# Patient Record
Sex: MALE | Race: WHITE | Employment: OTHER | URBAN - METROPOLITAN AREA
[De-identification: names, ages, dates, MRNs, and addresses within clinical notes are randomized per-mention and may not be internally consistent; named-entity substitution may affect disease eponyms.]

---

## 2020-08-10 ENCOUNTER — HOSPITAL ENCOUNTER (OUTPATIENT)
Age: 70
Discharge: HOME | End: 2020-08-12

## 2020-08-10 PROCEDURE — 87088 URINE BACTERIA CULTURE: CPT

## 2020-08-12 LAB — URINE CULTURE, ROUTINE: NORMAL

## 2021-09-28 ENCOUNTER — HOSPITAL ENCOUNTER (EMERGENCY)
Age: 71
Discharge: HOME | End: 2021-09-28
Attending: EMERGENCY MEDICINE
Payer: MEDICARE

## 2021-09-28 ENCOUNTER — APPOINTMENT (OUTPATIENT)
Dept: CT IMAGING | Age: 71
End: 2021-09-28
Payer: MEDICARE

## 2021-09-28 VITALS
HEART RATE: 78 BPM | HEIGHT: 66 IN | OXYGEN SATURATION: 95 % | RESPIRATION RATE: 20 BRPM | WEIGHT: 169 LBS | SYSTOLIC BLOOD PRESSURE: 148 MMHG | TEMPERATURE: 98.3 F | DIASTOLIC BLOOD PRESSURE: 79 MMHG | BODY MASS INDEX: 27.16 KG/M2

## 2021-09-28 DIAGNOSIS — N20.0 KIDNEY STONE: Primary | ICD-10-CM

## 2021-09-28 DIAGNOSIS — K83.8 PNEUMOBILIA: ICD-10-CM

## 2021-09-28 LAB
ANION GAP SERPL CALCULATED.3IONS-SCNC: 11 MMOL/L (ref 7–16)
BACTERIA: NORMAL /HPF
BASOPHILS ABSOLUTE: 0.06 E9/L (ref 0–0.2)
BASOPHILS RELATIVE PERCENT: 0.4 % (ref 0–2)
BILIRUBIN URINE: NEGATIVE
BLOOD, URINE: NORMAL
BUN BLDV-MCNC: 21 MG/DL (ref 6–23)
CALCIUM SERPL-MCNC: 9.4 MG/DL (ref 8.6–10.2)
CHLORIDE BLD-SCNC: 102 MMOL/L (ref 98–107)
CLARITY: CLEAR
CO2: 28 MMOL/L (ref 22–29)
COLOR: YELLOW
CREAT SERPL-MCNC: 1.4 MG/DL (ref 0.7–1.2)
EOSINOPHILS ABSOLUTE: 0.01 E9/L (ref 0.05–0.5)
EOSINOPHILS RELATIVE PERCENT: 0.1 % (ref 0–6)
EPITHELIAL CELLS, UA: NORMAL /HPF
GFR AFRICAN AMERICAN: >60
GFR NON-AFRICAN AMERICAN: 50 ML/MIN/1.73
GLUCOSE BLD-MCNC: 180 MG/DL (ref 74–99)
GLUCOSE URINE: NEGATIVE MG/DL
HCT VFR BLD CALC: 45.6 % (ref 37–54)
HEMOGLOBIN: 15.9 G/DL (ref 12.5–16.5)
IMMATURE GRANULOCYTES #: 0.11 E9/L
IMMATURE GRANULOCYTES %: 0.8 % (ref 0–5)
KETONES, URINE: NEGATIVE MG/DL
LEUKOCYTE ESTERASE, URINE: NEGATIVE
LYMPHOCYTES ABSOLUTE: 0.55 E9/L (ref 1.5–4)
LYMPHOCYTES RELATIVE PERCENT: 3.9 % (ref 20–42)
MCH RBC QN AUTO: 32.9 PG (ref 26–35)
MCHC RBC AUTO-ENTMCNC: 34.9 % (ref 32–34.5)
MCV RBC AUTO: 94.2 FL (ref 80–99.9)
MONOCYTES ABSOLUTE: 0.72 E9/L (ref 0.1–0.95)
MONOCYTES RELATIVE PERCENT: 5.2 % (ref 2–12)
NEUTROPHILS ABSOLUTE: 12.5 E9/L (ref 1.8–7.3)
NEUTROPHILS RELATIVE PERCENT: 89.6 % (ref 43–80)
NITRITE, URINE: NEGATIVE
PDW BLD-RTO: 13.2 FL (ref 11.5–15)
PH UA: 5 (ref 5–9)
PLATELET # BLD: 223 E9/L (ref 130–450)
PMV BLD AUTO: 9.6 FL (ref 7–12)
POTASSIUM SERPL-SCNC: 4.5 MMOL/L (ref 3.5–5)
PROTEIN UA: NEGATIVE MG/DL
RBC # BLD: 4.84 E12/L (ref 3.8–5.8)
RBC UA: NORMAL /HPF (ref 0–2)
SODIUM BLD-SCNC: 141 MMOL/L (ref 132–146)
SPECIFIC GRAVITY UA: >=1.03 (ref 1–1.03)
UROBILINOGEN, URINE: 0.2 E.U./DL
WBC # BLD: 14 E9/L (ref 4.5–11.5)
WBC UA: NORMAL /HPF (ref 0–5)

## 2021-09-28 PROCEDURE — 6360000002 HC RX W HCPCS: Performed by: EMERGENCY MEDICINE

## 2021-09-28 PROCEDURE — 80048 BASIC METABOLIC PNL TOTAL CA: CPT

## 2021-09-28 PROCEDURE — 96375 TX/PRO/DX INJ NEW DRUG ADDON: CPT

## 2021-09-28 PROCEDURE — 36415 COLL VENOUS BLD VENIPUNCTURE: CPT

## 2021-09-28 PROCEDURE — 2580000003 HC RX 258: Performed by: EMERGENCY MEDICINE

## 2021-09-28 PROCEDURE — 74176 CT ABD & PELVIS W/O CONTRAST: CPT

## 2021-09-28 PROCEDURE — 85025 COMPLETE CBC W/AUTO DIFF WBC: CPT

## 2021-09-28 PROCEDURE — 96374 THER/PROPH/DIAG INJ IV PUSH: CPT

## 2021-09-28 PROCEDURE — 99283 EMERGENCY DEPT VISIT LOW MDM: CPT

## 2021-09-28 PROCEDURE — 81001 URINALYSIS AUTO W/SCOPE: CPT

## 2021-09-28 PROCEDURE — 6370000000 HC RX 637 (ALT 250 FOR IP): Performed by: EMERGENCY MEDICINE

## 2021-09-28 RX ORDER — TAMSULOSIN HYDROCHLORIDE 0.4 MG/1
0.4 CAPSULE ORAL DAILY
Qty: 14 CAPSULE | Refills: 0 | Status: SHIPPED | OUTPATIENT
Start: 2021-09-28 | End: 2021-10-12

## 2021-09-28 RX ORDER — AMLODIPINE BESYLATE 5 MG/1
5 TABLET ORAL DAILY
COMMUNITY

## 2021-09-28 RX ORDER — HYDROCODONE BITARTRATE AND ACETAMINOPHEN 5; 325 MG/1; MG/1
1 TABLET ORAL ONCE
Status: COMPLETED | OUTPATIENT
Start: 2021-09-28 | End: 2021-09-28

## 2021-09-28 RX ORDER — 0.9 % SODIUM CHLORIDE 0.9 %
500 INTRAVENOUS SOLUTION INTRAVENOUS ONCE
Status: COMPLETED | OUTPATIENT
Start: 2021-09-28 | End: 2021-09-28

## 2021-09-28 RX ORDER — HYDROCODONE BITARTRATE AND ACETAMINOPHEN 5; 325 MG/1; MG/1
1 TABLET ORAL EVERY 6 HOURS PRN
Qty: 6 TABLET | Refills: 0 | Status: SHIPPED | OUTPATIENT
Start: 2021-09-28 | End: 2021-10-01

## 2021-09-28 RX ORDER — ALLOPURINOL 300 MG/1
300 TABLET ORAL DAILY
COMMUNITY

## 2021-09-28 RX ORDER — LISINOPRIL AND HYDROCHLOROTHIAZIDE 25; 20 MG/1; MG/1
1 TABLET ORAL DAILY
COMMUNITY

## 2021-09-28 RX ORDER — IBUPROFEN 800 MG/1
800 TABLET ORAL EVERY 8 HOURS PRN
Qty: 21 TABLET | Refills: 0 | Status: SHIPPED | OUTPATIENT
Start: 2021-09-28 | End: 2021-10-05

## 2021-09-28 RX ORDER — PRAVASTATIN SODIUM 40 MG
40 TABLET ORAL DAILY
COMMUNITY

## 2021-09-28 RX ORDER — ONDANSETRON 4 MG/1
4 TABLET, ORALLY DISINTEGRATING ORAL EVERY 8 HOURS PRN
Qty: 24 TABLET | Refills: 0 | Status: SHIPPED | OUTPATIENT
Start: 2021-09-28 | End: 2021-10-08

## 2021-09-28 RX ORDER — ONDANSETRON 2 MG/ML
8 INJECTION INTRAMUSCULAR; INTRAVENOUS ONCE
Status: COMPLETED | OUTPATIENT
Start: 2021-09-28 | End: 2021-09-28

## 2021-09-28 RX ORDER — KETOROLAC TROMETHAMINE 30 MG/ML
15 INJECTION, SOLUTION INTRAMUSCULAR; INTRAVENOUS ONCE
Status: COMPLETED | OUTPATIENT
Start: 2021-09-28 | End: 2021-09-28

## 2021-09-28 RX ADMIN — SODIUM CHLORIDE 500 ML: 9 INJECTION, SOLUTION INTRAVENOUS at 08:19

## 2021-09-28 RX ADMIN — HYDROCODONE BITARTRATE AND ACETAMINOPHEN 1 TABLET: 5; 325 TABLET ORAL at 09:11

## 2021-09-28 RX ADMIN — SODIUM CHLORIDE 500 ML: 9 INJECTION, SOLUTION INTRAVENOUS at 07:29

## 2021-09-28 RX ADMIN — KETOROLAC TROMETHAMINE 15 MG: 30 INJECTION, SOLUTION INTRAMUSCULAR at 07:29

## 2021-09-28 RX ADMIN — ONDANSETRON 8 MG: 2 INJECTION INTRAMUSCULAR; INTRAVENOUS at 07:29

## 2021-09-28 ASSESSMENT — PAIN SCALES - GENERAL
PAINLEVEL_OUTOF10: 8
PAINLEVEL_OUTOF10: 8
PAINLEVEL_OUTOF10: 4

## 2021-09-28 ASSESSMENT — PAIN DESCRIPTION - LOCATION: LOCATION: ABDOMEN;FLANK

## 2021-09-28 ASSESSMENT — ENCOUNTER SYMPTOMS
SINUS PRESSURE: 0
NAUSEA: 1
COUGH: 0
WHEEZING: 0
VOMITING: 1
ABDOMINAL PAIN: 1
EYE PAIN: 0
SORE THROAT: 0
DIARRHEA: 0
EYE REDNESS: 0
SHORTNESS OF BREATH: 0
BLOOD IN STOOL: 0
EYE DISCHARGE: 0
BACK PAIN: 0

## 2021-09-28 ASSESSMENT — PAIN DESCRIPTION - DESCRIPTORS: DESCRIPTORS: SHARP

## 2021-09-28 ASSESSMENT — PAIN DESCRIPTION - ORIENTATION: ORIENTATION: LEFT

## 2021-09-28 ASSESSMENT — PAIN DESCRIPTION - PAIN TYPE: TYPE: ACUTE PAIN

## 2021-09-28 NOTE — ED PROVIDER NOTES
Department of Emergency Medicine   ED  Provider Note  Admit Date/RoomTime: 9/28/2021  5:23 AM  ED Room: 08/08 9/28/21  6:25 AM EDT    History of Present Illness:  Chief Complaint   Patient presents with    Abdominal Pain     left flank and abd pain x 3 days    Flank Pain    Tawny Goodwin is a 78 y.o. male presenting to the ED for left flank pain radiating to LLQ since SUnday. The complaint has been intermittent, severe in severity, and worsened by nothing. Pain had improvd yesterday, worsened again since 3am with associated nausea and vomiting. He denies any fevers, chills, dysuria, diarrhea constipation. He followed with Dr. Jarrett Lam for enlarged prostate and urolith        Review of Systems:   Pertinent positives and negatives are stated within HPI, all other systems reviewed and are negative. Review of Systems   Constitutional: Negative for chills and fever. HENT: Negative for ear pain, sinus pressure and sore throat. Eyes: Negative for pain, discharge and redness. Respiratory: Negative for cough, shortness of breath and wheezing. Cardiovascular: Negative for chest pain. Gastrointestinal: Positive for abdominal pain, nausea and vomiting. Negative for blood in stool and diarrhea. Genitourinary: Positive for flank pain. Negative for dysuria, frequency, scrotal swelling and testicular pain. Musculoskeletal: Negative for arthralgias and back pain. Skin: Negative for rash and wound. Neurological: Negative for weakness and headaches. Hematological: Negative for adenopathy. All other systems reviewed and are negative.           --------------------------------------------- PAST HISTORY ---------------------------------------------  Past Medical History:  has a past medical history of Hyperlipidemia and Hypertension. Past Surgical History:  has a past surgical history that includes Cholecystectomy. Social History:  reports that he has quit smoking.  He has never used smokeless tobacco. He reports previous alcohol use. He reports that he does not use drugs. Family History: family history is not on file. Unless otherwise noted, family history is non contributory    The patients home medications have been reviewed. Allergies: Lipitor [atorvastatin]        ------------------------- NURSING NOTES AND VITALS REVIEWED ---------------------------   The nursing notes within the ED encounter and vital signs as below have been reviewed. BP (!) 148/79   Pulse 78   Temp 98.3 °F (36.8 °C) (Oral)   Resp 20   Ht 5' 6\" (1.676 m)   Wt 169 lb (76.7 kg)   SpO2 95%   BMI 27.28 kg/m²   Oxygen Saturation Interpretation: Normal    The patients available past medical records and past encounters were reviewed. ---------------------------------------------------PHYSICAL EXAM--------------------------------------    Physical Exam  Vitals and nursing note reviewed. Constitutional:       Appearance: He is well-developed. HENT:      Head: Normocephalic and atraumatic. Eyes:      Conjunctiva/sclera: Conjunctivae normal.   Cardiovascular:      Rate and Rhythm: Normal rate and regular rhythm. Pulses: Normal pulses. Heart sounds: Normal heart sounds. No murmur heard. Pulmonary:      Effort: Pulmonary effort is normal. No respiratory distress. Breath sounds: Normal breath sounds. No wheezing or rales. Abdominal:      General: Bowel sounds are normal.      Palpations: Abdomen is soft. Tenderness: There is abdominal tenderness in the left lower quadrant. There is no guarding or rebound. Musculoskeletal:         General: No tenderness or deformity. Cervical back: Normal range of motion and neck supple. Skin:     General: Skin is warm and dry. Neurological:      Mental Status: He is alert and oriented to person, place, and time. Cranial Nerves: No cranial nerve deficit.       Coordination: Coordination normal. -------------------------------------------------- RESULTS -------------------------------------------------    LABS: (Lab results interpreted by me)  Results for orders placed or performed during the hospital encounter of 09/28/21   Urinalysis with Microscopic   Result Value Ref Range    Color, UA Yellow Straw/Yellow    Clarity, UA Clear Clear    Glucose, Ur Negative Negative mg/dL    Bilirubin Urine Negative Negative    Ketones, Urine Negative Negative mg/dL    Specific Gravity, UA >=1.030 1.005 - 1.030    Blood, Urine TRACE-INTACT Negative    pH, UA 5.0 5.0 - 9.0    Protein, UA Negative Negative mg/dL    Urobilinogen, Urine 0.2 <2.0 E.U./dL    Nitrite, Urine Negative Negative    Leukocyte Esterase, Urine Negative Negative    WBC, UA 0-1 0 - 5 /HPF    RBC, UA 0-1 0 - 2 /HPF    Epithelial Cells, UA FEW /HPF    Bacteria, UA NONE SEEN None Seen /HPF   CBC Auto Differential   Result Value Ref Range    WBC 14.0 (H) 4.5 - 11.5 E9/L    RBC 4.84 3.80 - 5.80 E12/L    Hemoglobin 15.9 12.5 - 16.5 g/dL    Hematocrit 45.6 37.0 - 54.0 %    MCV 94.2 80.0 - 99.9 fL    MCH 32.9 26.0 - 35.0 pg    MCHC 34.9 (H) 32.0 - 34.5 %    RDW 13.2 11.5 - 15.0 fL    Platelets 937 417 - 768 E9/L    MPV 9.6 7.0 - 12.0 fL    Neutrophils % 89.6 (H) 43.0 - 80.0 %    Immature Granulocytes % 0.8 0.0 - 5.0 %    Lymphocytes % 3.9 (L) 20.0 - 42.0 %    Monocytes % 5.2 2.0 - 12.0 %    Eosinophils % 0.1 0.0 - 6.0 %    Basophils % 0.4 0.0 - 2.0 %    Neutrophils Absolute 12.50 (H) 1.80 - 7.30 E9/L    Immature Granulocytes # 0.11 E9/L    Lymphocytes Absolute 0.55 (L) 1.50 - 4.00 E9/L    Monocytes Absolute 0.72 0.10 - 0.95 E9/L    Eosinophils Absolute 0.01 (L) 0.05 - 0.50 E9/L    Basophils Absolute 0.06 0.00 - 0.20 W0/B   Basic Metabolic Panel   Result Value Ref Range    Sodium 141 132 - 146 mmol/L    Potassium 4.5 3.5 - 5.0 mmol/L    Chloride 102 98 - 107 mmol/L    CO2 28 22 - 29 mmol/L    Anion Gap 11 7 - 16 mmol/L    Glucose 180 (H) 74 - 99 mg/dL BUN 21 6 - 23 mg/dL    CREATININE 1.4 (H) 0.7 - 1.2 mg/dL    GFR Non-African American 50 >=60 mL/min/1.73    GFR African American >60     Calcium 9.4 8.6 - 10.2 mg/dL       All radiology results have been personally reviewed by myself   RADIOLOGY:  Interpreted by Radiologist.  Clare Sweet CONTRAST Additional Contrast? None   Final Result   6 mm proximal left ureteral stone with mild hydronephrosis. Mild pneumobilia. Debris-filled periampullary duodenal diverticulum measuring 3.3 cm. Heterogeneous enlarged prostate.                 ------------------------------ ED COURSE/MEDICAL DECISION MAKING----------------------  Medications   ondansetron (ZOFRAN) injection 8 mg (8 mg IntraVENous Given 21 07)   ketorolac (TORADOL) injection 15 mg (15 mg IntraVENous Given 21 07)   0.9 % sodium chloride bolus (0 mLs IntraVENous Stopped 21 0907)   0.9 % sodium chloride bolus (0 mLs IntraVENous Stopped 21 0940)   HYDROcodone-acetaminophen (NORCO) 5-325 MG per tablet 1 tablet (1 tablet Oral Given 21 0911)       Re-evaluations & Consults:  ED Course as of Sep 28 1032   Tue Sep 28, 2021   0609 Not currently in room    [MF]   0802 Feeling better discussed results and plan. He is agreeable all questions answered. He does not know his baseline renal function instructed patient to follow-up for monitoring as well as follow-up with urology for the stone and return precautions    [MF]      ED Course User Index  [MF] Carleen Hawley, DO          Medical Decision Makin-year-old male with renal stone feeling better after medication, IV fluids, discharged home with symptomatic supportive care, urology follow-up and return precautions      This patient's ED course included: a personal history and physicial examination and re-evaluation prior to disposition    This patient has remained hemodynamically stable and improved during their ED course.       Critical Care Time:

## 2021-09-30 ENCOUNTER — HOSPITAL ENCOUNTER (EMERGENCY)
Age: 71
Discharge: HOME | End: 2021-10-01
Attending: EMERGENCY MEDICINE
Payer: MEDICARE

## 2021-09-30 VITALS
BODY MASS INDEX: 27.16 KG/M2 | WEIGHT: 169 LBS | SYSTOLIC BLOOD PRESSURE: 137 MMHG | DIASTOLIC BLOOD PRESSURE: 70 MMHG | OXYGEN SATURATION: 96 % | HEART RATE: 66 BPM | RESPIRATION RATE: 16 BRPM | TEMPERATURE: 98.7 F | HEIGHT: 66 IN

## 2021-09-30 DIAGNOSIS — N20.1 URETEROLITHIASIS: Primary | ICD-10-CM

## 2021-09-30 PROCEDURE — 99283 EMERGENCY DEPT VISIT LOW MDM: CPT

## 2021-10-01 NOTE — ED PROVIDER NOTES
HPI:  10/1/21, Time: 12:54 AM EDT        Vanessa Martinez is a 78 y.o. male presenting to the ED for complaint of constipation and flank pain that was this morning similar to his leg pain for the past week for kidney stones that now resolved, beginning 1 week ago. The complaint has been intermittent, mild in severity, and worsened by nothing. He states he has no pain at this time did have some flank pain earlier states he does have anxiety over taking the pain medication prescribed by urology for his kidney stone and was presenting to the emergency department to determine if he needs to continue to take these. Patient denies any take pain at this time physical exam was completely reassuring patient was well-appearing and denied any complaints. .     Patient denies fever/chills, sore throat, cough, congestion, chest pain, shortness of breath, edema, headache, visual disturbances, focal paresthesias, focal weakness, abdominal pain, nausea, vomiting, diarrhea, constipation, dysuria, hematuria, trauma, neck or back pain, rash or other complaints. ROS:   A complete review of systems was performed and all pertinent positives and negatives are stated within HPI, all other systems reviewed and are negative.            --------------------------------------------- PAST HISTORY ---------------------------------------------  Past Medical History:  has a past medical history of Hyperlipidemia and Hypertension. Past Surgical History:  has a past surgical history that includes Cholecystectomy. Social History:  reports that he has quit smoking. He has never used smokeless tobacco. He reports previous alcohol use. He reports that he does not use drugs. Family History: family history is not on file. The patients home medications have been reviewed.     Allergies: Lipitor [atorvastatin]        ----------------------------------------PHYSICAL EXAM--------------------------------------    Constitutional:  Well developed, well nourished, no acute distress, non-toxic appearance   Eyes:  PERRL, conjunctiva normal, EOMI  HENT:  Atraumatic, external ears normal, nose normal, oropharynx moist, no pharyngeal exudates. Neck- normal range of motion, no nuchal rigidity   Respiratory:  No respiratory distress, normal breath sounds, no rales, no wheezing   Cardiovascular:  Normal rate, normal rhythm, no murmurs, no gallops, no rubs. Radial and DP pulses 2+ bilaterally. GI:  Soft, nondistended, normal bowel sounds, nontender, no organomegaly, no mass, no rebound, no guarding   :  No costovertebral angle tenderness   Musculoskeletal:  No edema, no tenderness, no deformities. Back- no tenderness  Integument:  Well hydrated, no rash. Adequate perfusion. Lymphatic:  No cervical lymphadenopathy noted   Neurologic:  Alert & oriented x 3, CN 2-12 normal, normal motor function, normal sensory function, no focal deficits noted. Normal gait. Psychiatric:  Speech and behavior appropriate       -------------------------------------------------- RESULTS -------------------------------------------------  I have personally reviewed all laboratory and imaging results for this patient. Results are listed below. LABS:  No results found for this visit on 09/30/21. RADIOLOGY:  Interpreted by Radiologist.  No orders to display             ------------------------- NURSING NOTES AND VITALS REVIEWED ---------------------------  The nursing notes within the ED encounter and vital signs as below have been reviewed by myself. /70   Pulse 66   Temp 98.7 °F (37.1 °C) (Tympanic)   Resp 16   Ht 5' 6\" (1.676 m)   Wt 169 lb (76.7 kg)   SpO2 96%   BMI 27.28 kg/m²   Oxygen Saturation Interpretation: Normal      The patients available past medical records and past encounters were reviewed.         ------------------------------ ED COURSE/MEDICAL DECISION MAKING----------------------  Medications - No data to display MEDICATIONS  New Prescriptions    No medications on file               Medical Decision Making:    After reassuring physical examination and evaluation patient was reassured that did not need to take the pain medication prescribed to him if he did not have pain in this flank. He was also told that he could take Tylenol 650 mg every 6 hours for any recurrence of pain if he does not feel comfortable taking the prescription pain medications given to him. Patient was told to continue with his scheduled follow-up with urology on Monday and to come back to the emergency department if he has any worsening conditions or other concerns. Patient was explicitly instructed on specific signs and symptoms on which to return to the emergency room for. Patient was instructed to return to the ER for any new or worsening symptoms. Additional discharge instructions were given verbally. All questions were answered. Patient is comfortable and agreeable with discharge plan. Patient in no acute distress and non-toxic in appearance. This patient's ED course included: re-evaluation prior to disposition, multiple bedside re-evaluations and a personal history and physicial eaxmination    This patient has remained hemodynamically stable and been closely monitored during their ED course. Re-Evaluations:  Time: 2330   Re-evaluation. Patients symptoms show no change  Repeat physical examination is not changed            Counseling: The emergency provider has spoken with the patient and discussed todays results, in addition to providing specific details for the plan of care and counseling regarding the diagnosis and prognosis. Questions are answered at this time and they are agreeable with the plan.    --------------------------- IMPRESSION AND DISPOSITION ---------------------------------    IMPRESSION  1.  Ureterolithiasis        DISPOSITION  Disposition: Discharge to home  Patient condition is stable             Natalia Martínez Alta Mejia, DO  Resident  10/01/21 5412

## 2021-10-01 NOTE — ED NOTES
FIRST PROVIDER CONTACT ASSESSMENT NOTE      Department of Emergency Medicine   9/30/21  10:21 PM EDT    Chief Complaint: Flank Pain (has kidney stone diagnosis on tuesday here, has not passed yet) and Constipation (hasnt gone since monday)      History of Present Illness:   Cruzito Lomeli is a 78 y.o. male who presents to the ED for constipation and abdominal pain     Medical History:  has a past medical history of Hyperlipidemia and Hypertension. Surgical History:  has a past surgical history that includes Cholecystectomy. Social History:  reports that he has quit smoking. He has never used smokeless tobacco. He reports previous alcohol use. He reports that he does not use drugs. Family History: family history is not on file.     *ALLERGIES*     Lipitor [atorvastatin]     Physical Exam:      VS:  /70   Pulse 66   Temp 98.7 °F (37.1 °C) (Tympanic)   Resp 16   Ht 5' 6\" (1.676 m)   Wt 169 lb (76.7 kg)   SpO2 96%   BMI 27.28 kg/m²      Initial Plan of Care:  Initiate Treatment-Testing, Proceed toTreatment Area When Bed Available for ED Attending/MLP to Continue Care    -----------------END OF FIRST PROVIDER CONTACT ASSESSMENT NOTE--------------  Electronically signed by GARETH Ayers CNP   DD: 9/30/21           GARETH Mckinney CNP  09/30/21 7111

## 2021-10-01 NOTE — ED NOTES
FIRST PROVIDER CONTACT ASSESSMENT NOTE      Department of Emergency Medicine   9/30/21  10:21 PM EDT    Chief Complaint: Flank Pain (has kidney stone diagnosis on tuesday here, has not passed yet) and Constipation (hasnt gone since monday)      History of Present Illness:   Gary Jon is a 78 y.o. male who presents to the ED for abdominal pain, constipation     Medical History:  has a past medical history of Hyperlipidemia and Hypertension. Surgical History:  has a past surgical history that includes Cholecystectomy. Social History:  reports that he has quit smoking. He has never used smokeless tobacco. He reports previous alcohol use. He reports that he does not use drugs. Family History: family history is not on file.     *ALLERGIES*     Lipitor [atorvastatin]     Physical Exam:      VS:  /70   Pulse 66   Temp 98.7 °F (37.1 °C) (Tympanic)   Resp 16   Ht 5' 6\" (1.676 m)   Wt 169 lb (76.7 kg)   SpO2 96%   BMI 27.28 kg/m²      Initial Plan of Care:  Initiate Treatment-Testing, Proceed toTreatment Area When Bed Available for ED Attending/MLP to Continue Care    -----------------END OF FIRST PROVIDER CONTACT ASSESSMENT NOTE--------------  Electronically signed by GARETH Gonzalez CNP   DD: 9/30/21           GARETH Contreras CNP  09/30/21 0613

## 2021-10-04 ENCOUNTER — HOSPITAL ENCOUNTER (OUTPATIENT)
Age: 71
Discharge: HOME | End: 2021-10-06

## 2021-10-04 PROCEDURE — 82365 CALCULUS SPECTROSCOPY: CPT

## 2021-10-04 PROCEDURE — 88300 SURGICAL PATH GROSS: CPT

## 2021-10-09 LAB
CALCULI COMPOSITION: NORMAL
MASS: 9 MG
STONE DESCRIPTION: NORMAL
STONE NUMBER: 3
STONE SIZE: NORMAL MM

## 2023-07-19 RX ORDER — FINASTERIDE 5 MG/1
TABLET, FILM COATED ORAL
COMMUNITY

## 2023-07-19 RX ORDER — PRAVASTATIN SODIUM 40 MG
TABLET ORAL
COMMUNITY
Start: 2021-01-19

## 2023-07-19 RX ORDER — LISINOPRIL AND HYDROCHLOROTHIAZIDE 25; 20 MG/1; MG/1
TABLET ORAL
COMMUNITY
Start: 2021-01-19

## 2023-07-20 ENCOUNTER — OFFICE VISIT (OUTPATIENT)
Dept: PRIMARY CARE CLINIC | Age: 73
End: 2023-07-20
Payer: MEDICARE

## 2023-07-20 VITALS
HEART RATE: 70 BPM | HEIGHT: 66 IN | DIASTOLIC BLOOD PRESSURE: 72 MMHG | SYSTOLIC BLOOD PRESSURE: 120 MMHG | TEMPERATURE: 98.1 F | BODY MASS INDEX: 28.06 KG/M2 | OXYGEN SATURATION: 96 % | WEIGHT: 174.6 LBS

## 2023-07-20 DIAGNOSIS — I10 PRIMARY HYPERTENSION: Primary | ICD-10-CM

## 2023-07-20 DIAGNOSIS — M10.00 IDIOPATHIC GOUT, UNSPECIFIED CHRONICITY, UNSPECIFIED SITE: ICD-10-CM

## 2023-07-20 DIAGNOSIS — E78.5 HYPERLIPIDEMIA, UNSPECIFIED HYPERLIPIDEMIA TYPE: ICD-10-CM

## 2023-07-20 DIAGNOSIS — N40.0 BENIGN PROSTATIC HYPERPLASIA WITHOUT LOWER URINARY TRACT SYMPTOMS: ICD-10-CM

## 2023-07-20 DIAGNOSIS — M62.838 MUSCLE SPASMS OF BOTH LOWER EXTREMITIES: ICD-10-CM

## 2023-07-20 PROBLEM — J45.909 ASTHMA: Status: ACTIVE | Noted: 2022-07-15

## 2023-07-20 PROCEDURE — 3074F SYST BP LT 130 MM HG: CPT | Performed by: STUDENT IN AN ORGANIZED HEALTH CARE EDUCATION/TRAINING PROGRAM

## 2023-07-20 PROCEDURE — 3078F DIAST BP <80 MM HG: CPT | Performed by: STUDENT IN AN ORGANIZED HEALTH CARE EDUCATION/TRAINING PROGRAM

## 2023-07-20 PROCEDURE — G8427 DOCREV CUR MEDS BY ELIG CLIN: HCPCS | Performed by: STUDENT IN AN ORGANIZED HEALTH CARE EDUCATION/TRAINING PROGRAM

## 2023-07-20 PROCEDURE — 3017F COLORECTAL CA SCREEN DOC REV: CPT | Performed by: STUDENT IN AN ORGANIZED HEALTH CARE EDUCATION/TRAINING PROGRAM

## 2023-07-20 PROCEDURE — G8419 CALC BMI OUT NRM PARAM NOF/U: HCPCS | Performed by: STUDENT IN AN ORGANIZED HEALTH CARE EDUCATION/TRAINING PROGRAM

## 2023-07-20 PROCEDURE — 1123F ACP DISCUSS/DSCN MKR DOCD: CPT | Performed by: STUDENT IN AN ORGANIZED HEALTH CARE EDUCATION/TRAINING PROGRAM

## 2023-07-20 PROCEDURE — 1036F TOBACCO NON-USER: CPT | Performed by: STUDENT IN AN ORGANIZED HEALTH CARE EDUCATION/TRAINING PROGRAM

## 2023-07-20 PROCEDURE — 99203 OFFICE O/P NEW LOW 30 MIN: CPT | Performed by: STUDENT IN AN ORGANIZED HEALTH CARE EDUCATION/TRAINING PROGRAM

## 2023-07-20 RX ORDER — ALBUTEROL SULFATE 90 UG/1
AEROSOL, METERED RESPIRATORY (INHALATION)
COMMUNITY

## 2023-07-20 RX ORDER — METHOCARBAMOL 750 MG/1
750 TABLET, FILM COATED ORAL 3 TIMES DAILY PRN
Qty: 60 TABLET | Refills: 0 | Status: SHIPPED | OUTPATIENT
Start: 2023-07-20 | End: 2023-08-09

## 2023-07-20 SDOH — ECONOMIC STABILITY: INCOME INSECURITY: HOW HARD IS IT FOR YOU TO PAY FOR THE VERY BASICS LIKE FOOD, HOUSING, MEDICAL CARE, AND HEATING?: NOT VERY HARD

## 2023-07-20 SDOH — ECONOMIC STABILITY: FOOD INSECURITY: WITHIN THE PAST 12 MONTHS, YOU WORRIED THAT YOUR FOOD WOULD RUN OUT BEFORE YOU GOT MONEY TO BUY MORE.: NEVER TRUE

## 2023-07-20 SDOH — ECONOMIC STABILITY: FOOD INSECURITY: WITHIN THE PAST 12 MONTHS, THE FOOD YOU BOUGHT JUST DIDN'T LAST AND YOU DIDN'T HAVE MONEY TO GET MORE.: NEVER TRUE

## 2023-07-20 SDOH — ECONOMIC STABILITY: HOUSING INSECURITY
IN THE LAST 12 MONTHS, WAS THERE A TIME WHEN YOU DID NOT HAVE A STEADY PLACE TO SLEEP OR SLEPT IN A SHELTER (INCLUDING NOW)?: NO

## 2023-07-20 ASSESSMENT — PATIENT HEALTH QUESTIONNAIRE - PHQ9
SUM OF ALL RESPONSES TO PHQ QUESTIONS 1-9: 0
SUM OF ALL RESPONSES TO PHQ9 QUESTIONS 1 & 2: 0
1. LITTLE INTEREST OR PLEASURE IN DOING THINGS: 0
2. FEELING DOWN, DEPRESSED OR HOPELESS: 0

## 2023-07-20 ASSESSMENT — ENCOUNTER SYMPTOMS
DIARRHEA: 1
EYES NEGATIVE: 1
RESPIRATORY NEGATIVE: 1

## 2023-07-20 NOTE — PROGRESS NOTES
Bee Vegas (:  1950) is a 81 y.o. male,New patient, here for evaluation of the following chief complaint(s):  New Patient (Previous dr Mich Saini ) and Leg Pain (Restless leg at night and toe feels like its swollen)         ASSESSMENT/PLAN:  1. Primary hypertension  2. Hyperlipidemia, unspecified hyperlipidemia type  3. Benign prostatic hyperplasia without lower urinary tract symptoms  4. Idiopathic gout, unspecified chronicity, unspecified site  5. Muscle spasms of both lower extremities  -     methocarbamol (ROBAXIN-750) 750 MG tablet; Take 1 tablet by mouth 3 times daily as needed (muscle cramps), Disp-60 tablet, R-0Normal      Return in 6 months (on 2024) for Due for AWV. Will get records from Dr. Viviana Tucker and labs from [facility]    Symptoms more consistent with a leg spasm so advised OTC magnesium; symptoms are not consistent with RLS     Subjective   SUBJECTIVE/OBJECTIVE:  HPI    Patient is a 82 y/o F with a PMHx of gout, HTN, HLD, and BPH who presents to [facility].  Previously saw Dr. Glenys Gonzales through [facility]- last seen in May     He is complaining of restless legs at night that started 2 months ago; he is describing a pain in his legs that will wake him up at night; he denies the sensation of needing to move his legs; he is also complaining of a sensation of feeling swelling in his toe; it will get worse when he tries to stretch his legs; he denies any cramping when walking    HTN- on HCTZ-lisinopril and amlodipine    He had COVID twice and after the first episode he had a cough for 6 months and  followed with Dr Ana Alas; he uses an albuterol inhaler PRN and cough is much improved     BPH- on flomax and finesteride and follows with urology; he had an MRI on his prostate and he reports that his prostate is significantly enlarged but no evidence of cancer    HLD- on pravastatin    SGHx: CCY, hernia repair, urolift    Social: former smoker    He has had a colonoscopy with

## 2023-07-20 NOTE — PATIENT INSTRUCTIONS
Take over the counter magnesium oxide 400 mg at night and take the robaxin as needed up to 3 times a day

## 2023-09-14 ENCOUNTER — HOSPITAL ENCOUNTER (OUTPATIENT)
Age: 73
Discharge: HOME | End: 2023-09-14
Payer: MEDICARE

## 2023-09-14 LAB — PSA SERPL-MCNC: 4.65 NG/ML (ref 0–4)

## 2023-09-14 PROCEDURE — 36415 COLL VENOUS BLD VENIPUNCTURE: CPT

## 2023-09-14 PROCEDURE — 84153 ASSAY OF PSA TOTAL: CPT

## 2023-12-18 ENCOUNTER — TELEPHONE (OUTPATIENT)
Dept: PRIMARY CARE CLINIC | Age: 73
End: 2023-12-18

## 2023-12-18 RX ORDER — PRAVASTATIN SODIUM 40 MG
40 TABLET ORAL DAILY
Qty: 90 TABLET | Refills: 1 | Status: CANCELLED | OUTPATIENT
Start: 2023-12-18

## 2023-12-18 RX ORDER — LISINOPRIL AND HYDROCHLOROTHIAZIDE 25; 20 MG/1; MG/1
1 TABLET ORAL DAILY
Qty: 90 TABLET | Refills: 1 | Status: CANCELLED | OUTPATIENT
Start: 2023-12-18

## 2023-12-18 RX ORDER — AMLODIPINE BESYLATE 5 MG/1
5 TABLET ORAL DAILY
Qty: 90 TABLET | Refills: 1 | Status: CANCELLED | OUTPATIENT
Start: 2023-12-18

## 2023-12-18 RX ORDER — ALLOPURINOL 300 MG/1
300 TABLET ORAL DAILY
Qty: 90 TABLET | Refills: 1 | Status: CANCELLED | OUTPATIENT
Start: 2023-12-18

## 2024-02-05 RX ORDER — ALLOPURINOL 300 MG/1
300 TABLET ORAL DAILY
Qty: 90 TABLET | Refills: 0 | Status: SHIPPED | OUTPATIENT
Start: 2024-02-05 | End: 2024-05-05

## 2024-02-05 RX ORDER — AMLODIPINE BESYLATE 5 MG/1
5 TABLET ORAL DAILY
Qty: 90 TABLET | Refills: 0 | Status: SHIPPED | OUTPATIENT
Start: 2024-02-05 | End: 2024-05-05

## 2024-02-05 RX ORDER — LISINOPRIL AND HYDROCHLOROTHIAZIDE 25; 20 MG/1; MG/1
1 TABLET ORAL DAILY
Qty: 90 TABLET | Refills: 0 | Status: SHIPPED | OUTPATIENT
Start: 2024-02-05 | End: 2024-05-05

## 2024-02-05 RX ORDER — PRAVASTATIN SODIUM 40 MG
40 TABLET ORAL DAILY
Qty: 90 TABLET | Refills: 0 | Status: SHIPPED | OUTPATIENT
Start: 2024-02-05 | End: 2024-05-05

## 2024-03-12 ENCOUNTER — OFFICE VISIT (OUTPATIENT)
Dept: PRIMARY CARE CLINIC | Age: 74
End: 2024-03-12
Payer: MEDICARE

## 2024-03-12 VITALS
HEIGHT: 66 IN | SYSTOLIC BLOOD PRESSURE: 114 MMHG | TEMPERATURE: 97.6 F | OXYGEN SATURATION: 98 % | BODY MASS INDEX: 28.25 KG/M2 | HEART RATE: 82 BPM | DIASTOLIC BLOOD PRESSURE: 70 MMHG | WEIGHT: 175.8 LBS

## 2024-03-12 DIAGNOSIS — E78.2 MIXED HYPERLIPIDEMIA: ICD-10-CM

## 2024-03-12 DIAGNOSIS — N40.0 BENIGN PROSTATIC HYPERPLASIA WITHOUT LOWER URINARY TRACT SYMPTOMS: ICD-10-CM

## 2024-03-12 DIAGNOSIS — I10 PRIMARY HYPERTENSION: Primary | ICD-10-CM

## 2024-03-12 DIAGNOSIS — J45.20 MILD INTERMITTENT ASTHMA WITHOUT COMPLICATION: ICD-10-CM

## 2024-03-12 DIAGNOSIS — Z11.59 NEED FOR HEPATITIS C SCREENING TEST: ICD-10-CM

## 2024-03-12 DIAGNOSIS — R53.83 OTHER FATIGUE: ICD-10-CM

## 2024-03-12 DIAGNOSIS — I45.10 RIGHT BUNDLE BRANCH BLOCK: ICD-10-CM

## 2024-03-12 DIAGNOSIS — M1A.00X0 IDIOPATHIC CHRONIC GOUT WITHOUT TOPHUS, UNSPECIFIED SITE: ICD-10-CM

## 2024-03-12 DIAGNOSIS — I10 PRIMARY HYPERTENSION: ICD-10-CM

## 2024-03-12 LAB
ABSOLUTE IMMATURE GRANULOCYTE: 0.09 K/UL (ref 0–0.58)
BASOPHILS ABSOLUTE: 0.06 K/UL (ref 0–0.2)
BASOPHILS RELATIVE PERCENT: 1 % (ref 0–2)
BILIRUBIN URINE: NEGATIVE
COLOR: YELLOW
COMMENT: NORMAL
EOSINOPHILS ABSOLUTE: 0.09 K/UL (ref 0.05–0.5)
EOSINOPHILS RELATIVE PERCENT: 1 % (ref 0–6)
GLUCOSE URINE: NEGATIVE MG/DL
HCT VFR BLD CALC: 52.5 % (ref 37–54)
HEMOGLOBIN: 17.4 G/DL (ref 12.5–16.5)
IMMATURE GRANULOCYTES: 1 % (ref 0–5)
KETONES, URINE: NEGATIVE MG/DL
LEUKOCYTE ESTERASE, URINE: NEGATIVE
LYMPHOCYTES ABSOLUTE: 1.3 K/UL (ref 1.5–4)
LYMPHOCYTES RELATIVE PERCENT: 14 % (ref 20–42)
MCH RBC QN AUTO: 32.7 PG (ref 26–35)
MCHC RBC AUTO-ENTMCNC: 33.1 G/DL (ref 32–34.5)
MCV RBC AUTO: 98.7 FL (ref 80–99.9)
MONOCYTES ABSOLUTE: 0.72 K/UL (ref 0.1–0.95)
MONOCYTES RELATIVE PERCENT: 8 % (ref 2–12)
NEUTROPHILS ABSOLUTE: 6.75 K/UL (ref 1.8–7.3)
NEUTROPHILS RELATIVE PERCENT: 75 % (ref 43–80)
NITRITE, URINE: NEGATIVE
PDW BLD-RTO: 14.5 % (ref 11.5–15)
PH UA: 5.5 (ref 5–9)
PLATELET, FLUORESCENCE: 207 K/UL (ref 130–450)
PMV BLD AUTO: 10.7 FL (ref 7–12)
PROTEIN UA: NEGATIVE MG/DL
RBC # BLD: 5.32 M/UL (ref 3.8–5.8)
SPECIFIC GRAVITY UA: 1.02 (ref 1–1.03)
TURBIDITY: CLEAR
URINE HGB: NEGATIVE
UROBILINOGEN, URINE: 0.2 EU/DL (ref 0–1)
WBC # BLD: 9 K/UL (ref 4.5–11.5)

## 2024-03-12 PROCEDURE — G8419 CALC BMI OUT NRM PARAM NOF/U: HCPCS | Performed by: INTERNAL MEDICINE

## 2024-03-12 PROCEDURE — 3074F SYST BP LT 130 MM HG: CPT | Performed by: INTERNAL MEDICINE

## 2024-03-12 PROCEDURE — 3078F DIAST BP <80 MM HG: CPT | Performed by: INTERNAL MEDICINE

## 2024-03-12 PROCEDURE — G8427 DOCREV CUR MEDS BY ELIG CLIN: HCPCS | Performed by: INTERNAL MEDICINE

## 2024-03-12 PROCEDURE — 99215 OFFICE O/P EST HI 40 MIN: CPT | Performed by: INTERNAL MEDICINE

## 2024-03-12 PROCEDURE — 1036F TOBACCO NON-USER: CPT | Performed by: INTERNAL MEDICINE

## 2024-03-12 PROCEDURE — 1123F ACP DISCUSS/DSCN MKR DOCD: CPT | Performed by: INTERNAL MEDICINE

## 2024-03-12 PROCEDURE — 3017F COLORECTAL CA SCREEN DOC REV: CPT | Performed by: INTERNAL MEDICINE

## 2024-03-12 PROCEDURE — G8484 FLU IMMUNIZE NO ADMIN: HCPCS | Performed by: INTERNAL MEDICINE

## 2024-03-12 RX ORDER — LISINOPRIL 20 MG/1
20 TABLET ORAL DAILY
Qty: 90 TABLET | Refills: 1 | Status: SHIPPED | OUTPATIENT
Start: 2024-03-12

## 2024-03-12 ASSESSMENT — PATIENT HEALTH QUESTIONNAIRE - PHQ9
SUM OF ALL RESPONSES TO PHQ9 QUESTIONS 1 & 2: 0
SUM OF ALL RESPONSES TO PHQ QUESTIONS 1-9: 0
1. LITTLE INTEREST OR PLEASURE IN DOING THINGS: 0
SUM OF ALL RESPONSES TO PHQ QUESTIONS 1-9: 0
2. FEELING DOWN, DEPRESSED OR HOPELESS: 0

## 2024-03-12 NOTE — PROGRESS NOTES
Chief Complaint   Patient presents with    New Patient     Former pt. Of Dr. Cabezas with no recent labs on file. AWV to be scheduled at next visit.        HPI: This is a patient of Dr. Cabezas here to establish himself in our clinic.  This is a 73-year-old white male with past medical history significant for hypertension hyperlipidemia chronic gout and BPH.  He has not had any blood work done for quite some time.  He denies any shortness of breath chest pains abdominal pains nausea vomiting or diarrhea.  His blood pressure has been well-controlled on current measures.  Patient was counseled that he needs to be off the hydrochlorothiazide as it increases his chances of getting acute gout arthritis and he agreed so we will switch him to lisinopril 20 mg daily and follow the blood pressure closely    Social history: Retired,  x 2 lives with his wife and his brother and his nephew.  Denies any tobacco or alcohol use or any drug abuse.    Surgical history: Hernia repair: Cholecystectomy    Family history: Mother with hypertension and hyperlipidemia and father passed away from lung cancer     Past Medical History, Surgical History, and Family History has been reviewed and updated.    Review of Systems:  Constitutional:  No fever, no fatigue, no chills, no headaches, no weight change  Dermatology:  No rash, no mole, no dry or sensitive skin  ENT:  No cough, no sore throat, no sinus pain, no runny nose, no ear pain  Cardiology:  No chest pain, no palpitations, no leg edema, no shortness of breath, no PND  Gastroenterology:  No dysphagia, no abdominal pain, no nausea, no vomiting, no constipation, no diarrhea, no heartburn  Musculoskeletal:  No joint pain, no leg cramps, no back pain, no muscle aches  Respiratory:  No shortness of breath, no orthopnea, no wheezing, no CAMP, no hemoptysis  Urology:  No blood in the urine, no urinary frequency, no urinary incontinence, no urinary urgency, no nocturia, no dysuria    Vitals:

## 2024-03-13 DIAGNOSIS — D75.1 ERYTHROCYTOSIS: Primary | ICD-10-CM

## 2024-03-13 LAB
ALBUMIN SERPL-MCNC: 4.6 G/DL (ref 3.5–5.2)
ALP BLD-CCNC: 69 U/L (ref 40–129)
ALT SERPL-CCNC: 27 U/L (ref 0–40)
ANION GAP SERPL CALCULATED.3IONS-SCNC: 12 MMOL/L (ref 7–16)
AST SERPL-CCNC: 24 U/L (ref 0–39)
BILIRUB SERPL-MCNC: 1 MG/DL (ref 0–1.2)
BUN BLDV-MCNC: 18 MG/DL (ref 6–23)
CALCIUM SERPL-MCNC: 9.7 MG/DL (ref 8.6–10.2)
CHLORIDE BLD-SCNC: 100 MMOL/L (ref 98–107)
CHOLESTEROL: 184 MG/DL
CO2: 27 MMOL/L (ref 22–29)
CREAT SERPL-MCNC: 1.1 MG/DL (ref 0.7–1.2)
GFR SERPL CREATININE-BSD FRML MDRD: >60 ML/MIN/1.73M2
GLUCOSE BLD-MCNC: 105 MG/DL (ref 74–99)
HDLC SERPL-MCNC: 45 MG/DL
HEPATITIS C ANTIBODY: NONREACTIVE
LDL CHOLESTEROL: 112 MG/DL
POTASSIUM SERPL-SCNC: 4 MMOL/L (ref 3.5–5)
SODIUM BLD-SCNC: 139 MMOL/L (ref 132–146)
TOTAL PROTEIN: 7.6 G/DL (ref 6.4–8.3)
TRIGL SERPL-MCNC: 135 MG/DL
TSH SERPL DL<=0.05 MIU/L-ACNC: 1.32 UIU/ML (ref 0.27–4.2)
VLDLC SERPL CALC-MCNC: 27 MG/DL

## 2024-04-12 ENCOUNTER — HOSPITAL ENCOUNTER (OUTPATIENT)
Age: 74
Discharge: HOME | End: 2024-04-12
Payer: MEDICARE

## 2024-04-12 DIAGNOSIS — D75.1 ERYTHROCYTOSIS: ICD-10-CM

## 2024-04-12 LAB
BASOPHILS # BLD: 0.06 K/UL (ref 0–0.2)
BASOPHILS NFR BLD: 1 % (ref 0–2)
EOSINOPHIL # BLD: 0.12 K/UL (ref 0.05–0.5)
EOSINOPHILS RELATIVE PERCENT: 2 % (ref 0–6)
ERYTHROCYTE [DISTWIDTH] IN BLOOD BY AUTOMATED COUNT: 13.7 % (ref 11.5–15)
HCT VFR BLD AUTO: 44.7 % (ref 37–54)
HGB BLD-MCNC: 15.9 G/DL (ref 12.5–16.5)
IMM GRANULOCYTES # BLD AUTO: 0.08 K/UL (ref 0–0.58)
IMM GRANULOCYTES NFR BLD: 1 % (ref 0–5)
LYMPHOCYTES NFR BLD: 1.37 K/UL (ref 1.5–4)
LYMPHOCYTES RELATIVE PERCENT: 19 % (ref 20–42)
MCH RBC QN AUTO: 33.5 PG (ref 26–35)
MCHC RBC AUTO-ENTMCNC: 35.6 G/DL (ref 32–34.5)
MCV RBC AUTO: 94.1 FL (ref 80–99.9)
MONOCYTES NFR BLD: 0.51 K/UL (ref 0.1–0.95)
MONOCYTES NFR BLD: 7 % (ref 2–12)
NEUTROPHILS NFR BLD: 71 % (ref 43–80)
NEUTS SEG NFR BLD: 5.11 K/UL (ref 1.8–7.3)
PLATELET # BLD AUTO: 218 K/UL (ref 130–450)
PMV BLD AUTO: 9.8 FL (ref 7–12)
RBC # BLD AUTO: 4.75 M/UL (ref 3.8–5.8)
WBC OTHER # BLD: 7.3 K/UL (ref 4.5–11.5)

## 2024-04-12 PROCEDURE — 36415 COLL VENOUS BLD VENIPUNCTURE: CPT

## 2024-04-12 PROCEDURE — 85025 COMPLETE CBC W/AUTO DIFF WBC: CPT

## 2024-04-23 ENCOUNTER — TELEPHONE (OUTPATIENT)
Dept: PRIMARY CARE CLINIC | Age: 74
End: 2024-04-23

## 2024-04-25 RX ORDER — LISINOPRIL AND HYDROCHLOROTHIAZIDE 25; 20 MG/1; MG/1
1 TABLET ORAL DAILY
Qty: 90 TABLET | Refills: 0 | OUTPATIENT
Start: 2024-04-25

## 2024-04-25 RX ORDER — AMLODIPINE BESYLATE 5 MG/1
5 TABLET ORAL DAILY
Qty: 90 TABLET | Refills: 0 | Status: SHIPPED | OUTPATIENT
Start: 2024-04-25 | End: 2024-07-24

## 2024-04-25 RX ORDER — ALLOPURINOL 300 MG/1
300 TABLET ORAL DAILY
Qty: 90 TABLET | Refills: 0 | Status: SHIPPED | OUTPATIENT
Start: 2024-04-25 | End: 2024-07-24

## 2024-04-25 RX ORDER — PRAVASTATIN SODIUM 40 MG
40 TABLET ORAL DAILY
Qty: 90 TABLET | Refills: 0 | Status: SHIPPED | OUTPATIENT
Start: 2024-04-25 | End: 2024-07-24

## 2024-06-03 ENCOUNTER — OFFICE VISIT (OUTPATIENT)
Dept: PRIMARY CARE CLINIC | Age: 74
End: 2024-06-03
Payer: MEDICARE

## 2024-06-03 VITALS
SYSTOLIC BLOOD PRESSURE: 138 MMHG | HEIGHT: 66 IN | TEMPERATURE: 97.7 F | OXYGEN SATURATION: 94 % | BODY MASS INDEX: 27.98 KG/M2 | DIASTOLIC BLOOD PRESSURE: 78 MMHG | WEIGHT: 174.1 LBS | HEART RATE: 75 BPM

## 2024-06-03 DIAGNOSIS — Z00.00 INITIAL MEDICARE ANNUAL WELLNESS VISIT: Primary | ICD-10-CM

## 2024-06-03 DIAGNOSIS — J45.20 MILD INTERMITTENT ASTHMA WITHOUT COMPLICATION: ICD-10-CM

## 2024-06-03 DIAGNOSIS — E78.2 MIXED HYPERLIPIDEMIA: ICD-10-CM

## 2024-06-03 DIAGNOSIS — N40.0 BENIGN PROSTATIC HYPERPLASIA WITHOUT LOWER URINARY TRACT SYMPTOMS: ICD-10-CM

## 2024-06-03 PROCEDURE — 1036F TOBACCO NON-USER: CPT | Performed by: INTERNAL MEDICINE

## 2024-06-03 PROCEDURE — G0438 PPPS, INITIAL VISIT: HCPCS | Performed by: INTERNAL MEDICINE

## 2024-06-03 PROCEDURE — 1123F ACP DISCUSS/DSCN MKR DOCD: CPT | Performed by: INTERNAL MEDICINE

## 2024-06-03 PROCEDURE — 3078F DIAST BP <80 MM HG: CPT | Performed by: INTERNAL MEDICINE

## 2024-06-03 PROCEDURE — 3017F COLORECTAL CA SCREEN DOC REV: CPT | Performed by: INTERNAL MEDICINE

## 2024-06-03 PROCEDURE — 3075F SYST BP GE 130 - 139MM HG: CPT | Performed by: INTERNAL MEDICINE

## 2024-06-03 PROCEDURE — G8427 DOCREV CUR MEDS BY ELIG CLIN: HCPCS | Performed by: INTERNAL MEDICINE

## 2024-06-03 PROCEDURE — G8419 CALC BMI OUT NRM PARAM NOF/U: HCPCS | Performed by: INTERNAL MEDICINE

## 2024-06-03 PROCEDURE — 99213 OFFICE O/P EST LOW 20 MIN: CPT | Performed by: INTERNAL MEDICINE

## 2024-06-03 RX ORDER — PRAVASTATIN SODIUM 40 MG
40 TABLET ORAL DAILY
Qty: 90 TABLET | Refills: 1 | Status: SHIPPED | OUTPATIENT
Start: 2024-06-03 | End: 2024-11-30

## 2024-06-03 RX ORDER — ALLOPURINOL 300 MG/1
300 TABLET ORAL DAILY
Qty: 90 TABLET | Refills: 1 | Status: SHIPPED | OUTPATIENT
Start: 2024-06-03 | End: 2024-11-30

## 2024-06-03 RX ORDER — AMLODIPINE BESYLATE 5 MG/1
5 TABLET ORAL DAILY
Qty: 90 TABLET | Refills: 1 | Status: SHIPPED | OUTPATIENT
Start: 2024-06-03 | End: 2024-11-30

## 2024-06-03 ASSESSMENT — PATIENT HEALTH QUESTIONNAIRE - PHQ9
SUM OF ALL RESPONSES TO PHQ QUESTIONS 1-9: 0
SUM OF ALL RESPONSES TO PHQ QUESTIONS 1-9: 0
1. LITTLE INTEREST OR PLEASURE IN DOING THINGS: NOT AT ALL
2. FEELING DOWN, DEPRESSED OR HOPELESS: NOT AT ALL
SUM OF ALL RESPONSES TO PHQ QUESTIONS 1-9: 0
SUM OF ALL RESPONSES TO PHQ9 QUESTIONS 1 & 2: 0
SUM OF ALL RESPONSES TO PHQ QUESTIONS 1-9: 0

## 2024-06-03 ASSESSMENT — LIFESTYLE VARIABLES
HOW MANY STANDARD DRINKS CONTAINING ALCOHOL DO YOU HAVE ON A TYPICAL DAY: PATIENT DOES NOT DRINK
HOW OFTEN DO YOU HAVE A DRINK CONTAINING ALCOHOL: NEVER

## 2024-06-03 NOTE — PROGRESS NOTES
Tete Wilson MD   albuterol sulfate HFA (PROVENTIL;VENTOLIN;PROAIR) 108 (90 Base) MCG/ACT inhaler Inhale into the lungs Yes Provider, MD Rubia   tamsulosin (FLOMAX) 0.4 MG capsule Take 1 capsule by mouth daily for 14 days Yes Huong Nieto DO CareTeam (Including outside providers/suppliers regularly involved in providing care):   Patient Care Team:  Tete Wilson MD as PCP - General (Internal Medicine)  Tete Wilson MD as PCP - Empaneled Provider     Reviewed and updated this visit:  Tobacco  Allergies  Meds  Med Hx  Surg Hx  Soc Hx  Fam Hx

## 2024-08-22 ENCOUNTER — APPOINTMENT (OUTPATIENT)
Dept: GENERAL RADIOLOGY | Age: 74
End: 2024-08-22
Payer: MEDICARE

## 2024-08-22 ENCOUNTER — HOSPITAL ENCOUNTER (EMERGENCY)
Age: 74
Discharge: HOME | End: 2024-08-22
Attending: EMERGENCY MEDICINE
Payer: MEDICARE

## 2024-08-22 VITALS
HEART RATE: 88 BPM | WEIGHT: 170 LBS | BODY MASS INDEX: 27.44 KG/M2 | TEMPERATURE: 99.8 F | DIASTOLIC BLOOD PRESSURE: 74 MMHG | SYSTOLIC BLOOD PRESSURE: 167 MMHG | OXYGEN SATURATION: 96 % | RESPIRATION RATE: 18 BRPM

## 2024-08-22 DIAGNOSIS — J18.9 PNEUMONIA OF LEFT LUNG DUE TO INFECTIOUS ORGANISM, UNSPECIFIED PART OF LUNG: Primary | ICD-10-CM

## 2024-08-22 LAB
ALBUMIN SERPL-MCNC: 4 G/DL (ref 3.5–5.2)
ALP SERPL-CCNC: 74 U/L (ref 40–129)
ALT SERPL-CCNC: 13 U/L (ref 0–40)
ANION GAP SERPL CALCULATED.3IONS-SCNC: 13 MMOL/L (ref 7–16)
AST SERPL-CCNC: 20 U/L (ref 0–39)
BASOPHILS # BLD: 0.04 K/UL (ref 0–0.2)
BASOPHILS NFR BLD: 1 % (ref 0–2)
BILIRUB SERPL-MCNC: 0.6 MG/DL (ref 0–1.2)
BNP SERPL-MCNC: 411 PG/ML (ref 0–125)
BUN SERPL-MCNC: 18 MG/DL (ref 6–23)
CALCIUM SERPL-MCNC: 8.8 MG/DL (ref 8.6–10.2)
CHLORIDE SERPL-SCNC: 103 MMOL/L (ref 98–107)
CO2 SERPL-SCNC: 24 MMOL/L (ref 22–29)
CREAT SERPL-MCNC: 1.1 MG/DL (ref 0.7–1.2)
D-DIMER QUANTITATIVE: <200 NG/ML DDU (ref 0–230)
EOSINOPHIL # BLD: 0.11 K/UL (ref 0.05–0.5)
EOSINOPHILS RELATIVE PERCENT: 2 % (ref 0–6)
ERYTHROCYTE [DISTWIDTH] IN BLOOD BY AUTOMATED COUNT: 13 % (ref 11.5–15)
FLUAV RNA RESP QL NAA+PROBE: NOT DETECTED
FLUBV RNA RESP QL NAA+PROBE: NOT DETECTED
GFR, ESTIMATED: 73 ML/MIN/1.73M2
GLUCOSE SERPL-MCNC: 120 MG/DL (ref 74–99)
HCT VFR BLD AUTO: 44.9 % (ref 37–54)
HGB BLD-MCNC: 15.5 G/DL (ref 12.5–16.5)
IMM GRANULOCYTES # BLD AUTO: <0.03 K/UL (ref 0–0.58)
IMM GRANULOCYTES NFR BLD: 0 % (ref 0–5)
LYMPHOCYTES NFR BLD: 0.7 K/UL (ref 1.5–4)
LYMPHOCYTES RELATIVE PERCENT: 13 % (ref 20–42)
MCH RBC QN AUTO: 31.1 PG (ref 26–35)
MCHC RBC AUTO-ENTMCNC: 34.5 G/DL (ref 32–34.5)
MCV RBC AUTO: 90.2 FL (ref 80–99.9)
MONOCYTES NFR BLD: 0.56 K/UL (ref 0.1–0.95)
MONOCYTES NFR BLD: 10 % (ref 2–12)
NEUTROPHILS NFR BLD: 75 % (ref 43–80)
NEUTS SEG NFR BLD: 4.19 K/UL (ref 1.8–7.3)
PLATELET # BLD AUTO: 183 K/UL (ref 130–450)
PMV BLD AUTO: 9.6 FL (ref 7–12)
POTASSIUM SERPL-SCNC: 4 MMOL/L (ref 3.5–5)
PROT SERPL-MCNC: 7 G/DL (ref 6.4–8.3)
RBC # BLD AUTO: 4.98 M/UL (ref 3.8–5.8)
SARS-COV-2 RNA RESP QL NAA+PROBE: NOT DETECTED
SODIUM SERPL-SCNC: 140 MMOL/L (ref 132–146)
SOURCE: NORMAL
SPECIMEN DESCRIPTION: NORMAL
TROPONIN I SERPL HS-MCNC: 12 NG/L (ref 0–11)
WBC OTHER # BLD: 5.6 K/UL (ref 4.5–11.5)

## 2024-08-22 PROCEDURE — 84484 ASSAY OF TROPONIN QUANT: CPT

## 2024-08-22 PROCEDURE — 83880 ASSAY OF NATRIURETIC PEPTIDE: CPT

## 2024-08-22 PROCEDURE — 99285 EMERGENCY DEPT VISIT HI MDM: CPT

## 2024-08-22 PROCEDURE — 85379 FIBRIN DEGRADATION QUANT: CPT

## 2024-08-22 PROCEDURE — 96374 THER/PROPH/DIAG INJ IV PUSH: CPT

## 2024-08-22 PROCEDURE — 85025 COMPLETE CBC W/AUTO DIFF WBC: CPT

## 2024-08-22 PROCEDURE — 93005 ELECTROCARDIOGRAM TRACING: CPT | Performed by: EMERGENCY MEDICINE

## 2024-08-22 PROCEDURE — 6360000002 HC RX W HCPCS: Performed by: EMERGENCY MEDICINE

## 2024-08-22 PROCEDURE — 71045 X-RAY EXAM CHEST 1 VIEW: CPT

## 2024-08-22 PROCEDURE — 87636 SARSCOV2 & INF A&B AMP PRB: CPT

## 2024-08-22 PROCEDURE — 80053 COMPREHEN METABOLIC PANEL: CPT

## 2024-08-22 PROCEDURE — 2580000003 HC RX 258: Performed by: EMERGENCY MEDICINE

## 2024-08-22 PROCEDURE — 6370000000 HC RX 637 (ALT 250 FOR IP): Performed by: EMERGENCY MEDICINE

## 2024-08-22 RX ORDER — AZITHROMYCIN 250 MG/1
500 TABLET, FILM COATED ORAL ONCE
Status: COMPLETED | OUTPATIENT
Start: 2024-08-22 | End: 2024-08-22

## 2024-08-22 RX ORDER — CEFDINIR 300 MG/1
300 CAPSULE ORAL 2 TIMES DAILY
Qty: 14 CAPSULE | Refills: 0 | Status: SHIPPED | OUTPATIENT
Start: 2024-08-22 | End: 2024-08-29

## 2024-08-22 RX ORDER — BROMPHENIRAMINE MALEATE, PSEUDOEPHEDRINE HYDROCHLORIDE, AND DEXTROMETHORPHAN HYDROBROMIDE 2; 30; 10 MG/5ML; MG/5ML; MG/5ML
1.25 SYRUP ORAL 4 TIMES DAILY PRN
Qty: 118 ML | Refills: 0 | Status: SHIPPED | OUTPATIENT
Start: 2024-08-22

## 2024-08-22 RX ORDER — AZITHROMYCIN 250 MG/1
TABLET, FILM COATED ORAL
Qty: 6 TABLET | Refills: 0 | Status: SHIPPED | OUTPATIENT
Start: 2024-08-22 | End: 2024-09-01

## 2024-08-22 RX ADMIN — WATER 2000 MG: 1 INJECTION INTRAMUSCULAR; INTRAVENOUS; SUBCUTANEOUS at 21:23

## 2024-08-22 RX ADMIN — AZITHROMYCIN DIHYDRATE 500 MG: 250 TABLET ORAL at 21:23

## 2024-08-22 ASSESSMENT — PAIN - FUNCTIONAL ASSESSMENT: PAIN_FUNCTIONAL_ASSESSMENT: NONE - DENIES PAIN

## 2024-08-22 NOTE — ED PROVIDER NOTES
This is a 73-year-old male with a past medical history of hypertension who presents to the ED for evaluation of a cough.  Patient states that about 1 week ago he had pulmonary tests performed and states that shortly thereafter he feels as though he increasing cough.  Patient states he does have some bodyaches and states he initially had some productive sputum but no longer does have this.  No reported hemoptysis.  No reported chest pain or shortness of breath.  Patient has not had a fever at home.    The history is provided by the patient.        Review of Systems   Constitutional:  Negative for fever.        Physical Exam  Vitals and nursing note reviewed.   Constitutional:       General: He is not in acute distress.     Appearance: He is well-developed.   HENT:      Head: Normocephalic and atraumatic.   Neck:      Vascular: No JVD.   Cardiovascular:      Rate and Rhythm: Normal rate and regular rhythm.   Pulmonary:      Effort: Pulmonary effort is normal.   Abdominal:      General: There is no distension.      Palpations: Abdomen is soft.   Musculoskeletal:      Cervical back: Normal range of motion.   Skin:     General: Skin is warm and dry.   Neurological:      Mental Status: He is alert and oriented to person, place, and time.      Cranial Nerves: No cranial nerve deficit.   Psychiatric:         Behavior: Behavior normal.          Procedures     MDM  Number of Diagnoses or Management Options  Pneumonia of left lung due to infectious organism, unspecified part of lung  Diagnosis management comments: This is a 73-year-old male who presented to the ED for evaluation of an ongoing cough for about the past 1 week.  Patient was hemodynamically stable had a low-grade fever had a broad differential including but not limited to PE, fluid overload and PNA to name a few of the many. Patient had no ischemic changes on his EKG, d-dimer was less than 230 making a PE seem unlikley and xray was concerning for PNA.

## 2024-08-23 LAB
EKG ATRIAL RATE: 93 BPM
EKG P AXIS: 43 DEGREES
EKG P-R INTERVAL: 160 MS
EKG Q-T INTERVAL: 334 MS
EKG QRS DURATION: 82 MS
EKG QTC CALCULATION (BAZETT): 415 MS
EKG R AXIS: -37 DEGREES
EKG T AXIS: 22 DEGREES
EKG VENTRICULAR RATE: 93 BPM

## 2024-08-23 PROCEDURE — 93010 ELECTROCARDIOGRAM REPORT: CPT | Performed by: INTERNAL MEDICINE

## 2024-09-05 DIAGNOSIS — I10 PRIMARY HYPERTENSION: ICD-10-CM

## 2024-09-05 RX ORDER — LISINOPRIL 20 MG/1
20 TABLET ORAL DAILY
Qty: 90 TABLET | Refills: 1 | Status: SHIPPED | OUTPATIENT
Start: 2024-09-05

## 2024-09-23 ENCOUNTER — HOSPITAL ENCOUNTER (OUTPATIENT)
Age: 74
Discharge: HOME | End: 2024-09-23
Payer: MEDICARE

## 2024-09-23 LAB — PSA SERPL-MCNC: 5.55 NG/ML (ref 0–4)

## 2024-09-23 PROCEDURE — 36415 COLL VENOUS BLD VENIPUNCTURE: CPT

## 2024-09-23 PROCEDURE — 84153 ASSAY OF PSA TOTAL: CPT

## 2024-10-01 ENCOUNTER — HOSPITAL ENCOUNTER (OUTPATIENT)
Dept: GENERAL RADIOLOGY | Age: 74
Discharge: HOME | End: 2024-10-03
Payer: MEDICARE

## 2024-10-01 ENCOUNTER — HOSPITAL ENCOUNTER (OUTPATIENT)
Age: 74
Discharge: HOME | End: 2024-10-03
Payer: MEDICARE

## 2024-10-01 DIAGNOSIS — R91.8 OTHER NONSPECIFIC ABNORMAL FINDING OF LUNG FIELD: ICD-10-CM

## 2024-10-01 PROCEDURE — 71046 X-RAY EXAM CHEST 2 VIEWS: CPT

## 2024-11-01 ENCOUNTER — OFFICE VISIT (OUTPATIENT)
Dept: PRIMARY CARE CLINIC | Age: 74
End: 2024-11-01

## 2024-11-01 VITALS
SYSTOLIC BLOOD PRESSURE: 132 MMHG | DIASTOLIC BLOOD PRESSURE: 74 MMHG | BODY MASS INDEX: 28.16 KG/M2 | TEMPERATURE: 97.6 F | OXYGEN SATURATION: 98 % | HEART RATE: 64 BPM | WEIGHT: 175.2 LBS | HEIGHT: 66 IN

## 2024-11-01 DIAGNOSIS — J45.20 MILD INTERMITTENT ASTHMA WITHOUT COMPLICATION: ICD-10-CM

## 2024-11-01 DIAGNOSIS — E78.2 MIXED HYPERLIPIDEMIA: ICD-10-CM

## 2024-11-01 DIAGNOSIS — N40.0 BENIGN PROSTATIC HYPERPLASIA WITHOUT LOWER URINARY TRACT SYMPTOMS: ICD-10-CM

## 2024-11-01 DIAGNOSIS — I10 BENIGN ESSENTIAL HTN: ICD-10-CM

## 2024-11-01 DIAGNOSIS — Z01.818 PREOPERATIVE CLEARANCE: Primary | ICD-10-CM

## 2024-11-01 DIAGNOSIS — Z01.818 PREOPERATIVE TESTING: ICD-10-CM

## 2024-11-01 DIAGNOSIS — R73.01 IMPAIRED FASTING BLOOD SUGAR: ICD-10-CM

## 2024-11-01 DIAGNOSIS — I45.10 RIGHT BUNDLE BRANCH BLOCK: ICD-10-CM

## 2024-11-01 SDOH — ECONOMIC STABILITY: FOOD INSECURITY: WITHIN THE PAST 12 MONTHS, THE FOOD YOU BOUGHT JUST DIDN'T LAST AND YOU DIDN'T HAVE MONEY TO GET MORE.: NEVER TRUE

## 2024-11-01 SDOH — ECONOMIC STABILITY: FOOD INSECURITY: WITHIN THE PAST 12 MONTHS, YOU WORRIED THAT YOUR FOOD WOULD RUN OUT BEFORE YOU GOT MONEY TO BUY MORE.: NEVER TRUE

## 2024-11-01 SDOH — ECONOMIC STABILITY: INCOME INSECURITY: HOW HARD IS IT FOR YOU TO PAY FOR THE VERY BASICS LIKE FOOD, HOUSING, MEDICAL CARE, AND HEATING?: NOT HARD AT ALL

## 2024-11-01 NOTE — PROGRESS NOTES
clearance    Preoperative testing  -     Cancel: EKG 12 Lead    Mixed hyperlipidemia  -     Cancel: Comprehensive Metabolic Panel; Future  -     Cancel: Lipid Panel; Future  -     Comprehensive Metabolic Panel; Future  -     Lipid Panel; Future    Benign essential HTN  -     Cancel: Comprehensive Metabolic Panel; Future  -     Comprehensive Metabolic Panel; Future    Right bundle branch block    Mild intermittent asthma without complication    Benign prostatic hyperplasia without lower urinary tract symptoms    Impaired fasting blood sugar  -     Cancel: Hemoglobin A1C; Future  -     Hemoglobin A1C; Future    Patient is medically cleared for surgery    There are no Patient Instructions on file for this visit.           Tete Wilson MD   11/1/24

## 2024-11-06 ENCOUNTER — HOSPITAL ENCOUNTER (OUTPATIENT)
Age: 74
Discharge: HOME | End: 2024-11-08
Payer: MEDICARE

## 2024-11-06 ENCOUNTER — HOSPITAL ENCOUNTER (OUTPATIENT)
Age: 74
Discharge: HOME | End: 2024-11-08

## 2024-11-06 LAB
ABO + RH BLD: NORMAL
ARM BAND NUMBER: NORMAL
BLOOD BANK SAMPLE EXPIRATION: NORMAL
BLOOD GROUP ANTIBODIES SERPL: NEGATIVE

## 2024-11-06 PROCEDURE — 86850 RBC ANTIBODY SCREEN: CPT

## 2024-11-06 PROCEDURE — 86901 BLOOD TYPING SEROLOGIC RH(D): CPT

## 2024-11-06 PROCEDURE — 86900 BLOOD TYPING SEROLOGIC ABO: CPT

## 2024-11-06 PROCEDURE — 87081 CULTURE SCREEN ONLY: CPT

## 2024-11-08 LAB
MICROORGANISM SPEC CULT: NORMAL
SPECIMEN DESCRIPTION: NORMAL

## 2024-11-12 ENCOUNTER — HOSPITAL ENCOUNTER (OUTPATIENT)
Age: 74
Discharge: HOME | End: 2024-11-14

## 2024-11-12 PROCEDURE — 88307 TISSUE EXAM BY PATHOLOGIST: CPT

## 2024-11-13 ENCOUNTER — HOSPITAL ENCOUNTER (OUTPATIENT)
Age: 74
Discharge: HOME | End: 2024-11-15

## 2024-11-13 LAB
ANION GAP SERPL CALCULATED.3IONS-SCNC: 11 MMOL/L (ref 7–16)
BUN SERPL-MCNC: 15 MG/DL (ref 6–23)
CALCIUM SERPL-MCNC: 9 MG/DL (ref 8.6–10.2)
CHLORIDE SERPL-SCNC: 101 MMOL/L (ref 98–107)
CO2 SERPL-SCNC: 25 MMOL/L (ref 22–29)
CREAT SERPL-MCNC: 1.1 MG/DL (ref 0.7–1.2)
CREATININE FLUID: 1.1 MG/DL
GFR, ESTIMATED: 74 ML/MIN/1.73M2
GLUCOSE SERPL-MCNC: 137 MG/DL (ref 74–99)
HCT VFR BLD AUTO: 42.7 % (ref 37–54)
HGB BLD-MCNC: 14.8 G/DL (ref 12.5–16.5)
POTASSIUM SERPL-SCNC: 4 MMOL/L (ref 3.5–5)
SODIUM SERPL-SCNC: 137 MMOL/L (ref 132–146)
SPECIMEN TYPE: NORMAL

## 2024-11-13 PROCEDURE — 80048 BASIC METABOLIC PNL TOTAL CA: CPT

## 2024-11-13 PROCEDURE — 85014 HEMATOCRIT: CPT

## 2024-11-13 PROCEDURE — 82570 ASSAY OF URINE CREATININE: CPT

## 2024-11-13 PROCEDURE — 85018 HEMOGLOBIN: CPT

## 2024-11-15 LAB — SURGICAL PATHOLOGY REPORT: NORMAL

## 2024-11-16 ENCOUNTER — HOSPITAL ENCOUNTER (EMERGENCY)
Age: 74
Discharge: HOME | End: 2024-11-16
Payer: MEDICARE

## 2024-11-16 VITALS
DIASTOLIC BLOOD PRESSURE: 80 MMHG | OXYGEN SATURATION: 99 % | RESPIRATION RATE: 18 BRPM | HEART RATE: 70 BPM | TEMPERATURE: 98 F | WEIGHT: 168 LBS | SYSTOLIC BLOOD PRESSURE: 130 MMHG | HEIGHT: 66 IN | BODY MASS INDEX: 27 KG/M2

## 2024-11-16 DIAGNOSIS — N39.0 COMPLICATED UTI (URINARY TRACT INFECTION): Primary | ICD-10-CM

## 2024-11-16 DIAGNOSIS — Z97.8 FOLEY CATHETER IN PLACE: ICD-10-CM

## 2024-11-16 LAB
BACTERIA URNS QL MICRO: ABNORMAL
BILIRUB UR QL STRIP: NEGATIVE
CLARITY UR: CLEAR
COLOR UR: YELLOW
GLUCOSE UR STRIP-MCNC: NEGATIVE MG/DL
HGB UR QL STRIP.AUTO: ABNORMAL
KETONES UR STRIP-MCNC: NEGATIVE MG/DL
LEUKOCYTE ESTERASE UR QL STRIP: ABNORMAL
NITRITE UR QL STRIP: NEGATIVE
PH UR STRIP: 5.5 [PH] (ref 5–9)
PROT UR STRIP-MCNC: 100 MG/DL
RBC #/AREA URNS HPF: ABNORMAL /HPF
SP GR UR STRIP: 1.01 (ref 1–1.03)
UROBILINOGEN UR STRIP-ACNC: 0.2 EU/DL (ref 0–1)
WBC #/AREA URNS HPF: ABNORMAL /HPF

## 2024-11-16 PROCEDURE — 99283 EMERGENCY DEPT VISIT LOW MDM: CPT

## 2024-11-16 PROCEDURE — 87086 URINE CULTURE/COLONY COUNT: CPT

## 2024-11-16 PROCEDURE — 6370000000 HC RX 637 (ALT 250 FOR IP)

## 2024-11-16 PROCEDURE — 81001 URINALYSIS AUTO W/SCOPE: CPT

## 2024-11-16 RX ORDER — PHENAZOPYRIDINE HYDROCHLORIDE 100 MG/1
200 TABLET, FILM COATED ORAL ONCE
Status: COMPLETED | OUTPATIENT
Start: 2024-11-16 | End: 2024-11-16

## 2024-11-16 RX ORDER — PHENAZOPYRIDINE HYDROCHLORIDE 100 MG/1
100 TABLET, FILM COATED ORAL 3 TIMES DAILY PRN
Qty: 9 TABLET | Refills: 0 | Status: SHIPPED | OUTPATIENT
Start: 2024-11-16 | End: 2024-11-19

## 2024-11-16 RX ORDER — CEPHALEXIN 500 MG/1
500 CAPSULE ORAL 4 TIMES DAILY
Qty: 20 CAPSULE | Refills: 0 | Status: SHIPPED | OUTPATIENT
Start: 2024-11-16 | End: 2024-11-21

## 2024-11-16 RX ORDER — CEPHALEXIN 500 MG/1
500 CAPSULE ORAL ONCE
Status: COMPLETED | OUTPATIENT
Start: 2024-11-16 | End: 2024-11-16

## 2024-11-16 RX ADMIN — CEPHALEXIN 500 MG: 500 CAPSULE ORAL at 21:50

## 2024-11-16 RX ADMIN — PHENAZOPYRIDINE 200 MG: 100 TABLET ORAL at 21:50

## 2024-11-16 ASSESSMENT — PAIN DESCRIPTION - LOCATION: LOCATION: OTHER (COMMENT)

## 2024-11-16 ASSESSMENT — PAIN SCALES - GENERAL: PAINLEVEL_OUTOF10: 6

## 2024-11-16 ASSESSMENT — PAIN - FUNCTIONAL ASSESSMENT: PAIN_FUNCTIONAL_ASSESSMENT: NONE - DENIES PAIN

## 2024-11-16 ASSESSMENT — PAIN DESCRIPTION - DESCRIPTORS: DESCRIPTORS: BURNING

## 2024-11-17 NOTE — ED PROVIDER NOTES
CNP   DD: 11/16/24  **This report was transcribed using voice recognition software. Every effort was made to ensure accuracy; however, inadvertent computerized transcription errors may be present.  END OF ED PROVIDER NOTE        Radha Valdez APRN - CNP  11/17/24 0000

## 2024-11-17 NOTE — ED NOTES
Department of Emergency Medicine  FIRST PROVIDER TRIAGE NOTE             Independent MLP           11/16/24  7:20 PM EST    Date of Encounter: 11/16/24   MRN: [id number]      HPI: Luca Spicer is a 74 y.o. male who presents to the ED for No chief complaint on file.  issue with catheter.  It is leaking around urethra.    ROS: Negative for abd pain or fever.    PE: Gen Appearance/Constitutional: alert  Musculoskeletal: moves all extremities x 4     Initial Plan of Care: All treatment areas with department are currently occupied. Plan to order/Initiate the following while awaiting opening in ED:  Initiate Treatment-Testing, Proceed toTreatment Area When Bed Available for ED Attending/MLP to Continue Care    Electronically signed by GARETH Muir NP   DD: 11/16/24       Eusebio Ya APRN - NP  11/16/24 1921

## 2024-11-18 LAB
MICROORGANISM SPEC CULT: NO GROWTH
SERVICE CMNT-IMP: NORMAL
SPECIMEN DESCRIPTION: NORMAL

## 2024-12-01 PROBLEM — Z01.818 PREOPERATIVE CLEARANCE: Status: RESOLVED | Noted: 2024-11-01 | Resolved: 2024-12-01

## 2024-12-25 ENCOUNTER — HOSPITAL ENCOUNTER (EMERGENCY)
Age: 74
Discharge: HOME | End: 2024-12-25
Attending: STUDENT IN AN ORGANIZED HEALTH CARE EDUCATION/TRAINING PROGRAM
Payer: MEDICARE

## 2024-12-25 VITALS
BODY MASS INDEX: 27 KG/M2 | TEMPERATURE: 97.3 F | HEART RATE: 85 BPM | HEIGHT: 66 IN | SYSTOLIC BLOOD PRESSURE: 166 MMHG | WEIGHT: 168 LBS | RESPIRATION RATE: 20 BRPM | DIASTOLIC BLOOD PRESSURE: 90 MMHG | OXYGEN SATURATION: 96 %

## 2024-12-25 DIAGNOSIS — R33.9 URINARY RETENTION: Primary | ICD-10-CM

## 2024-12-25 LAB
ANION GAP SERPL CALCULATED.3IONS-SCNC: 8 MMOL/L (ref 7–16)
BASOPHILS # BLD: 0.05 K/UL (ref 0–0.2)
BASOPHILS NFR BLD: 1 % (ref 0–2)
BILIRUB UR QL STRIP: NEGATIVE
BUN SERPL-MCNC: 22 MG/DL (ref 6–23)
CALCIUM SERPL-MCNC: 9.6 MG/DL (ref 8.6–10.2)
CHLORIDE SERPL-SCNC: 103 MMOL/L (ref 98–107)
CLARITY UR: CLEAR
CO2 SERPL-SCNC: 29 MMOL/L (ref 22–29)
COLOR UR: YELLOW
CREAT SERPL-MCNC: 1.1 MG/DL (ref 0.7–1.2)
EOSINOPHIL # BLD: 0.14 K/UL (ref 0.05–0.5)
EOSINOPHILS RELATIVE PERCENT: 2 % (ref 0–6)
ERYTHROCYTE [DISTWIDTH] IN BLOOD BY AUTOMATED COUNT: 13.2 % (ref 11.5–15)
GFR, ESTIMATED: 74 ML/MIN/1.73M2
GLUCOSE SERPL-MCNC: 132 MG/DL (ref 74–99)
GLUCOSE UR STRIP-MCNC: NEGATIVE MG/DL
HCT VFR BLD AUTO: 44.6 % (ref 37–54)
HGB BLD-MCNC: 15.4 G/DL (ref 12.5–16.5)
HGB UR QL STRIP.AUTO: ABNORMAL
IMM GRANULOCYTES # BLD AUTO: 0.04 K/UL (ref 0–0.58)
IMM GRANULOCYTES NFR BLD: 1 % (ref 0–5)
KETONES UR STRIP-MCNC: NEGATIVE MG/DL
LEUKOCYTE ESTERASE UR QL STRIP: ABNORMAL
LYMPHOCYTES NFR BLD: 1.03 K/UL (ref 1.5–4)
LYMPHOCYTES RELATIVE PERCENT: 16 % (ref 20–42)
MCH RBC QN AUTO: 32.3 PG (ref 26–35)
MCHC RBC AUTO-ENTMCNC: 34.5 G/DL (ref 32–34.5)
MCV RBC AUTO: 93.5 FL (ref 80–99.9)
MONOCYTES NFR BLD: 0.46 K/UL (ref 0.1–0.95)
MONOCYTES NFR BLD: 7 % (ref 2–12)
NEUTROPHILS NFR BLD: 74 % (ref 43–80)
NEUTS SEG NFR BLD: 4.88 K/UL (ref 1.8–7.3)
NITRITE UR QL STRIP: NEGATIVE
PH UR STRIP: 7 [PH] (ref 5–9)
PLATELET CONFIRMATION: NORMAL
PLATELET, FLUORESCENCE: 170 K/UL (ref 130–450)
PMV BLD AUTO: 10.6 FL (ref 7–12)
POTASSIUM SERPL-SCNC: 3.7 MMOL/L (ref 3.5–5)
PROT UR STRIP-MCNC: 100 MG/DL
RBC # BLD AUTO: 4.77 M/UL (ref 3.8–5.8)
RBC #/AREA URNS HPF: ABNORMAL /HPF
SODIUM SERPL-SCNC: 140 MMOL/L (ref 132–146)
SP GR UR STRIP: 1.02 (ref 1–1.03)
UROBILINOGEN UR STRIP-ACNC: 0.2 EU/DL (ref 0–1)
WBC #/AREA URNS HPF: ABNORMAL /HPF
WBC OTHER # BLD: 6.6 K/UL (ref 4.5–11.5)

## 2024-12-25 PROCEDURE — 85025 COMPLETE CBC W/AUTO DIFF WBC: CPT

## 2024-12-25 PROCEDURE — 80048 BASIC METABOLIC PNL TOTAL CA: CPT

## 2024-12-25 PROCEDURE — 81001 URINALYSIS AUTO W/SCOPE: CPT

## 2024-12-25 PROCEDURE — 99283 EMERGENCY DEPT VISIT LOW MDM: CPT

## 2024-12-25 PROCEDURE — 87086 URINE CULTURE/COLONY COUNT: CPT

## 2024-12-25 NOTE — DISCHARGE INSTR - COC
Continuity of Care Form    Patient Name: Luca Spicer   :  1950  MRN:  [id number]    Admit date:  2024  Discharge date:  ***    Code Status Order: No Order   Advance Directives:   Advance Care Flowsheet Documentation             Admitting Physician:  No admitting provider for patient encounter.  PCP: Tete Wilson MD    Discharging Nurse: ***  Discharging Hospital Unit/Room#:   Discharging Unit Phone Number: ***    Emergency Contact:   Extended Emergency Contact Information  Primary Emergency Contact: Jazymne Spicer  Address: [facility] [place] [address]  Home Phone: [phone]  Mobile Phone: [phone]  Relation: Spouse   needed? No    Past Surgical History:  Past Surgical History:   Procedure Laterality Date    CHOLECYSTECTOMY      HERNIA REPAIR         Immunization History:   Immunization History   Administered Date(s) Administered    COVID-19, PFIZER Bivalent, DO NOT Dilute, (age 12y+), IM, 30 mcg/0.3 mL 2023    COVID-19, PFIZER GRAY top, DO NOT Dilute, (age 12 y+), IM, 30 mcg/0.3 mL 2022    COVID-19, PFIZER PURPLE top, DILUTE for use, (age 12 y+), 30mcg/0.3mL 2021, 2021, 10/11/2021    COVID-19, PFIZER, (age 12y+), IM, 30mcg/0.3mL 10/16/2023, 10/01/2024       Active Problems:  Patient Active Problem List   Diagnosis Code    Arthritis of both knees M17.0    Benign prostatic hyperplasia N40.0    Diverticulosis of sigmoid colon K57.30    Chronic gout M1A.9XX0    Hyperlipidemia E78.5    Benign essential HTN I10    Right bundle branch block I45.10    Vasomotor rhinitis J30.0    Asthma J45.909    Impaired fasting blood sugar R73.01       Isolation/Infection:   Isolation            No Isolation          Patient Infection Status       None to display                     Nurse Assessment:  Last Vital Signs: BP (!) 166/90   Pulse 85   Temp 97.3 °F (36.3 °C)   Resp 20   Ht 1.676 m (5' 6\")   Wt 76.2 kg (168 lb)

## 2024-12-25 NOTE — DISCHARGE INSTRUCTIONS
often.        You feel pain when you urinate.        You have a fever.        You have new lower abdomen (belly) or back pain that does not go away.        Your urine is cloudy or smells bad.        Your mouth feels dry or you have vision changes.        You have questions or concerns about your condition, treatment, or care.    SEEK CARE IMMEDIATELY IF:        You feel confused.        You see blood in your urine.        Your surgery wound is bleeding and will not stop.        Your surgery wound is red, swollen, or has pus coming from it.

## 2024-12-26 LAB
MICROORGANISM SPEC CULT: NO GROWTH
SERVICE CMNT-IMP: NORMAL
SPECIMEN DESCRIPTION: NORMAL

## 2024-12-26 NOTE — ED PROVIDER NOTES
0.2 0.0 - 1.0 EU/dL    Nitrite, Urine NEGATIVE NEGATIVE    Leukocyte Esterase, Urine SMALL (A) NEGATIVE    WBC, UA 0 TO 5 0 TO 5 /HPF    RBC, UA 21 TO 50 (A) 0 TO 2 /HPF   CBC with Auto Differential   Result Value Ref Range    WBC 6.6 4.5 - 11.5 k/uL    RBC 4.77 3.80 - 5.80 m/uL    Hemoglobin 15.4 12.5 - 16.5 g/dL    Hematocrit 44.6 37.0 - 54.0 %    MCV 93.5 80.0 - 99.9 fL    MCH 32.3 26.0 - 35.0 pg    MCHC 34.5 32.0 - 34.5 g/dL    RDW 13.2 11.5 - 15.0 %    MPV 10.6 7.0 - 12.0 fL    Platelet, Fluorescence 170 130 - 450 k/uL    Neutrophils % 74 43.0 - 80.0 %    Lymphocytes % 16 (L) 20.0 - 42.0 %    Monocytes % 7 2.0 - 12.0 %    Eosinophils % 2 0 - 6 %    Basophils % 1 0.0 - 2.0 %    Immature Granulocytes % 1 0.0 - 5.0 %    Neutrophils Absolute 4.88 1.80 - 7.30 k/uL    Lymphocytes Absolute 1.03 (L) 1.50 - 4.00 k/uL    Monocytes Absolute 0.46 0.10 - 0.95 k/uL    Eosinophils Absolute 0.14 0.05 - 0.50 k/uL    Basophils Absolute 0.05 0.00 - 0.20 k/uL    Immature Granulocytes Absolute 0.04 0.00 - 0.58 k/uL   BMP   Result Value Ref Range    Sodium 140 132 - 146 mmol/L    Potassium 3.7 3.5 - 5.0 mmol/L    Chloride 103 98 - 107 mmol/L    CO2 29 22 - 29 mmol/L    Anion Gap 8 7 - 16 mmol/L    Glucose 132 (H) 74 - 99 mg/dL    BUN 22 6 - 23 mg/dL    Creatinine 1.1 0.70 - 1.20 mg/dL    Est, Glom Filt Rate 74 >60 mL/min/1.73m2    Calcium 9.6 8.6 - 10.2 mg/dL   Platelet Confirmation   Result Value Ref Range    Platelet Confirmation CONFIRMED        Radiology:  No orders to display       ------------------------- NURSING NOTES AND VITALS REVIEWED ---------------------------  Date / Time Roomed:  12/25/2024  3:23 AM  ED Bed Assignment:  04/04    The nursing notes within the ED encounter and vital signs as below have been reviewed.   BP (!) 166/90   Pulse 85   Temp 97.3 °F (36.3 °C)   Resp 20   Ht 1.676 m (5' 6\")   Wt 76.2 kg (168 lb)   SpO2 96%   BMI 27.12 kg/m²   Oxygen Saturation Interpretation: 
deficits, no CVA tenderness bilaterally.  Presentation concerning for but not limited to: BPH, nephrolithiasis, malignancy, urethral abnormality to name a few.    Labs reviewed by me showed CMP generally unremarkable with mild hyperglycemia, more importantly renal function is within normal limits which is reassuring.  CBC without leukocytosis or anemia which is reassuring.  UA with hematuria however negative for obvious signs of UTI.  Urine culture sent and pending.    Curry catheter was attempted to be placed by nursing staff, ED attending and myself, these attempts were unsuccessful secondary to distal urethral obstruction..    Given patient's presenting complaints and difficulty passing Curry catheter, decision was made to consult urology.  Spoke to Dr. Sauer, discussed patient, he reports the patient has a known distal urethral meatal stricture.  He is able to assess patient in person however is currently at an outside facility performing emergent procedures and will not be able to get to this facility until later in the day.  He reports that patient is urinating without pain or concerning symptoms, he can be further observed in the emergency department, present to [place] emergency department, or follow-up as outpatient in the office tomorrow.    Any procedures performed detailed above or in procedure section of note.    On reevaluation patient reports no acute complaints.  He is now passing some urine without any pain, bleeding or red flag symptoms.  He has no flank tenderness.  He is afebrile.  Offered continued observation in the emergency department versus discharge with close outpatient follow-up, patient prefers discharge at this time which is not unreasonable.    I discussed the results of the workup with the patient as well as the recommendation for discharge with symptomatic and supportive care and close outpatient follow-up.  Patient verbalizes their understanding and is agreeable and amenable to

## 2024-12-30 DIAGNOSIS — E78.2 MIXED HYPERLIPIDEMIA: ICD-10-CM

## 2024-12-30 NOTE — TELEPHONE ENCOUNTER
Name of Medication(s) Requested:  Requested Prescriptions     Pending Prescriptions Disp Refills    amLODIPine (NORVASC) 5 MG tablet [Pharmacy Med Name: AMLODIPINE BESYLATE TABS 5MG] 90 tablet 0     Sig: TAKE 1 TABLET DAILY    allopurinol (ZYLOPRIM) 300 MG tablet [Pharmacy Med Name: ALLOPURINOL TABS 300MG] 90 tablet 0     Sig: TAKE 1 TABLET DAILY    pravastatin (PRAVACHOL) 40 MG tablet [Pharmacy Med Name: PRAVASTATIN TABS 40MG] 90 tablet 0     Sig: TAKE 1 TABLET DAILY       Medication is on current medication list Yes    Dosage and directions were verified? Yes    Quantity verified: 90 day supply     Pharmacy Verified?  Yes    Last Appointment:  11/1/2024    Future appts:  Future Appointments   Date Time Provider Department Center   3/5/2025 10:00 AM Tete Wilson MD [facility] DE[facility]        (If no appt send self scheduling link. .REFILLAPPT)  Scheduling request sent?     [] Yes  [] No    Does patient need updated?  [] Yes  [x] No

## 2025-01-01 RX ORDER — ALLOPURINOL 300 MG/1
300 TABLET ORAL DAILY
Qty: 90 TABLET | Refills: 0 | Status: SHIPPED | OUTPATIENT
Start: 2025-01-01

## 2025-01-01 RX ORDER — PRAVASTATIN SODIUM 40 MG
40 TABLET ORAL DAILY
Qty: 90 TABLET | Refills: 0 | Status: SHIPPED | OUTPATIENT
Start: 2025-01-01

## 2025-01-01 RX ORDER — AMLODIPINE BESYLATE 5 MG/1
5 TABLET ORAL DAILY
Qty: 90 TABLET | Refills: 0 | Status: SHIPPED | OUTPATIENT
Start: 2025-01-01

## 2025-01-03 ENCOUNTER — TELEPHONE (OUTPATIENT)
Dept: PRIMARY CARE CLINIC | Age: 75
End: 2025-01-03

## 2025-01-03 NOTE — TELEPHONE ENCOUNTER
Patient called stating legs were swollen and a little painful and caused a ring around his sock and once he elevated his legs- they went back to normal     Advised patient if he had redness swelling and pain in lower ext he should go to walk in clinic or urgent care or hospital to have examination for possible blood clot    Patient states he did not feel it was that bad and would elevate his legs and drink gatorade and if he had any further issues would call back to get an appt sooner

## 2025-02-03 DIAGNOSIS — I10 PRIMARY HYPERTENSION: ICD-10-CM

## 2025-02-03 RX ORDER — LISINOPRIL 20 MG/1
20 TABLET ORAL DAILY
Qty: 90 TABLET | Refills: 0 | Status: SHIPPED | OUTPATIENT
Start: 2025-02-03

## 2025-02-03 NOTE — TELEPHONE ENCOUNTER
Name of Medication(s) Requested:  Requested Prescriptions     Pending Prescriptions Disp Refills    lisinopril (PRINIVIL;ZESTRIL) 20 MG tablet [Pharmacy Med Name: LISINOPRIL TABS 20MG] 90 tablet 0     Sig: TAKE 1 TABLET DAILY       Medication is on current medication list Yes    Dosage and directions were verified? Yes    Quantity verified: 90 day supply     Pharmacy Verified?  Yes    Last Appointment:  11/1/2024    Future appts:  Future Appointments   Date Time Provider Department Center   3/5/2025 10:00 AM Tete Wilson MD [facility] DEP        (If no appt send self scheduling link. .REFILLAPPT)  Scheduling request sent?     [] Yes  [] No    Does patient need updated?  [] Yes  [x] No

## 2025-02-07 ENCOUNTER — HOSPITAL ENCOUNTER (OUTPATIENT)
Age: 75
Discharge: HOME | End: 2025-02-07
Payer: MEDICARE

## 2025-02-07 DIAGNOSIS — R73.01 IMPAIRED FASTING BLOOD SUGAR: ICD-10-CM

## 2025-02-07 DIAGNOSIS — I10 BENIGN ESSENTIAL HTN: ICD-10-CM

## 2025-02-07 DIAGNOSIS — E78.2 MIXED HYPERLIPIDEMIA: ICD-10-CM

## 2025-02-07 LAB
ALBUMIN SERPL-MCNC: 4.3 G/DL (ref 3.5–5.2)
ALP SERPL-CCNC: 80 U/L (ref 40–129)
ALT SERPL-CCNC: 14 U/L (ref 0–40)
ANION GAP SERPL CALCULATED.3IONS-SCNC: 8 MMOL/L (ref 7–16)
AST SERPL-CCNC: 15 U/L (ref 0–39)
BILIRUB SERPL-MCNC: 0.9 MG/DL (ref 0–1.2)
BUN SERPL-MCNC: 14 MG/DL (ref 6–23)
CALCIUM SERPL-MCNC: 9.3 MG/DL (ref 8.6–10.2)
CHLORIDE SERPL-SCNC: 105 MMOL/L (ref 98–107)
CHOLEST SERPL-MCNC: 159 MG/DL
CO2 SERPL-SCNC: 30 MMOL/L (ref 22–29)
CREAT SERPL-MCNC: 1.1 MG/DL (ref 0.7–1.2)
GFR, ESTIMATED: 75 ML/MIN/1.73M2
GLUCOSE SERPL-MCNC: 110 MG/DL (ref 74–99)
HBA1C MFR BLD: 5.6 % (ref 4–5.6)
HDLC SERPL-MCNC: 43 MG/DL
LDLC SERPL CALC-MCNC: 90 MG/DL
POTASSIUM SERPL-SCNC: 4.3 MMOL/L (ref 3.5–5)
PROT SERPL-MCNC: 7.1 G/DL (ref 6.4–8.3)
PSA SERPL-MCNC: 1.52 NG/ML (ref 0–4)
SODIUM SERPL-SCNC: 143 MMOL/L (ref 132–146)
TRIGL SERPL-MCNC: 129 MG/DL
VLDLC SERPL CALC-MCNC: 26 MG/DL

## 2025-02-07 PROCEDURE — 80053 COMPREHEN METABOLIC PANEL: CPT

## 2025-02-07 PROCEDURE — 36415 COLL VENOUS BLD VENIPUNCTURE: CPT

## 2025-02-07 PROCEDURE — 80061 LIPID PANEL: CPT

## 2025-02-07 PROCEDURE — 83036 HEMOGLOBIN GLYCOSYLATED A1C: CPT

## 2025-02-07 PROCEDURE — 84153 ASSAY OF PSA TOTAL: CPT

## 2025-02-28 DIAGNOSIS — M25.561 RIGHT KNEE PAIN, UNSPECIFIED CHRONICITY: Primary | ICD-10-CM

## 2025-03-02 SDOH — ECONOMIC STABILITY: FOOD INSECURITY: WITHIN THE PAST 12 MONTHS, YOU WORRIED THAT YOUR FOOD WOULD RUN OUT BEFORE YOU GOT MONEY TO BUY MORE.: NEVER TRUE

## 2025-03-02 SDOH — ECONOMIC STABILITY: INCOME INSECURITY: IN THE LAST 12 MONTHS, WAS THERE A TIME WHEN YOU WERE NOT ABLE TO PAY THE MORTGAGE OR RENT ON TIME?: NO

## 2025-03-02 SDOH — ECONOMIC STABILITY: FOOD INSECURITY: WITHIN THE PAST 12 MONTHS, THE FOOD YOU BOUGHT JUST DIDN'T LAST AND YOU DIDN'T HAVE MONEY TO GET MORE.: NEVER TRUE

## 2025-03-02 SDOH — ECONOMIC STABILITY: TRANSPORTATION INSECURITY
IN THE PAST 12 MONTHS, HAS THE LACK OF TRANSPORTATION KEPT YOU FROM MEDICAL APPOINTMENTS OR FROM GETTING MEDICATIONS?: NO

## 2025-03-02 SDOH — ECONOMIC STABILITY: TRANSPORTATION INSECURITY
IN THE PAST 12 MONTHS, HAS LACK OF TRANSPORTATION KEPT YOU FROM MEETINGS, WORK, OR FROM GETTING THINGS NEEDED FOR DAILY LIVING?: NO

## 2025-03-02 ASSESSMENT — PATIENT HEALTH QUESTIONNAIRE - PHQ9
SUM OF ALL RESPONSES TO PHQ QUESTIONS 1-9: 0
1. LITTLE INTEREST OR PLEASURE IN DOING THINGS: NOT AT ALL
2. FEELING DOWN, DEPRESSED OR HOPELESS: NOT AT ALL
SUM OF ALL RESPONSES TO PHQ QUESTIONS 1-9: 0
SUM OF ALL RESPONSES TO PHQ QUESTIONS 1-9: 0
SUM OF ALL RESPONSES TO PHQ9 QUESTIONS 1 & 2: 0
SUM OF ALL RESPONSES TO PHQ QUESTIONS 1-9: 0
2. FEELING DOWN, DEPRESSED OR HOPELESS: NOT AT ALL
1. LITTLE INTEREST OR PLEASURE IN DOING THINGS: NOT AT ALL

## 2025-03-05 ENCOUNTER — OFFICE VISIT (OUTPATIENT)
Dept: PRIMARY CARE CLINIC | Age: 75
End: 2025-03-05

## 2025-03-05 VITALS
OXYGEN SATURATION: 94 % | TEMPERATURE: 97.8 F | HEART RATE: 68 BPM | DIASTOLIC BLOOD PRESSURE: 72 MMHG | BODY MASS INDEX: 26.69 KG/M2 | SYSTOLIC BLOOD PRESSURE: 136 MMHG | WEIGHT: 166.1 LBS | HEIGHT: 66 IN

## 2025-03-05 DIAGNOSIS — I45.10 RIGHT BUNDLE BRANCH BLOCK: ICD-10-CM

## 2025-03-05 DIAGNOSIS — M1A.00X0 IDIOPATHIC CHRONIC GOUT WITHOUT TOPHUS, UNSPECIFIED SITE: ICD-10-CM

## 2025-03-05 DIAGNOSIS — E78.2 MIXED HYPERLIPIDEMIA: ICD-10-CM

## 2025-03-05 DIAGNOSIS — I10 BENIGN ESSENTIAL HTN: Primary | ICD-10-CM

## 2025-03-05 DIAGNOSIS — R73.01 IMPAIRED FASTING BLOOD SUGAR: ICD-10-CM

## 2025-03-05 DIAGNOSIS — N40.0 BENIGN PROSTATIC HYPERPLASIA WITHOUT LOWER URINARY TRACT SYMPTOMS: ICD-10-CM

## 2025-03-05 RX ORDER — PRAVASTATIN SODIUM 40 MG
40 TABLET ORAL DAILY
Qty: 90 TABLET | Refills: 0 | Status: SHIPPED | OUTPATIENT
Start: 2025-03-05

## 2025-03-05 RX ORDER — LISINOPRIL 20 MG/1
20 TABLET ORAL DAILY
Qty: 90 TABLET | Refills: 0 | Status: SHIPPED | OUTPATIENT
Start: 2025-03-05

## 2025-03-05 RX ORDER — ALLOPURINOL 300 MG/1
300 TABLET ORAL DAILY
Qty: 90 TABLET | Refills: 0 | Status: SHIPPED | OUTPATIENT
Start: 2025-03-05

## 2025-03-05 RX ORDER — AMLODIPINE BESYLATE 5 MG/1
5 TABLET ORAL DAILY
Qty: 90 TABLET | Refills: 0 | Status: SHIPPED | OUTPATIENT
Start: 2025-03-05

## 2025-03-05 NOTE — PROGRESS NOTES
Chief Complaint   Patient presents with    Follow-up     Patient is a routine F/U, and has recent labs on file to review. Pt does C/o right knee discomfort and sees Dr. Pyle next week.        HPI:  Patient is here for follow-up of hypertension hyperlipidemia BPH status post TURP and impaired fasting sugar.  Patient is doing well, he had  robot-assisted laparoscopic simple prostatectomy.  He has been watching his carbohydrate intake lately and he had lost a couple of pounds.    Blood work was discussed with patient appears within reasonable range with improved impaired fasting sugar to 110.  Cholesterol is within reasonable range.    Patient is doing well but he is complaining about knee pain and he is seeing orthopedic surgery next week for possible injections.    He had some postop minor issues with urinary retention and he went to the emergency room and after some manipulation he was doing a lot better.  He still complaining about waking up in the middle of the night several times to go to the restroom but he is feeling a lot better than before.    Past Medical History, Surgical History, and Family History has been reviewed and updated.    Review of Systems:  Constitutional:  No fever, no fatigue, no chills, no headaches, no weight change  Dermatology:  No rash, no mole, no dry or sensitive skin  ENT:  No cough, no sore throat, no sinus pain, no runny nose, no ear pain  Cardiology:  No chest pain, no palpitations, no leg edema, no shortness of breath, no PND  Gastroenterology:  No dysphagia, no abdominal pain, no nausea, no vomiting, no constipation, no diarrhea, no heartburn  Musculoskeletal:  No joint pain, no leg cramps, no back pain, no muscle aches  Respiratory:  No shortness of breath, no orthopnea, no wheezing, no CAMP, no hemoptysis  Urology:  No blood in the urine, no urinary frequency, no urinary incontinence, no urinary urgency, no nocturia, no dysuria    Vitals:    03/05/25 0959   BP: 136/72   Pulse:

## 2025-03-07 SDOH — HEALTH STABILITY: PHYSICAL HEALTH: ON AVERAGE, HOW MANY MINUTES DO YOU ENGAGE IN EXERCISE AT THIS LEVEL?: 0 MIN

## 2025-03-07 SDOH — HEALTH STABILITY: PHYSICAL HEALTH: ON AVERAGE, HOW MANY DAYS PER WEEK DO YOU ENGAGE IN MODERATE TO STRENUOUS EXERCISE (LIKE A BRISK WALK)?: 1 DAY

## 2025-03-10 ENCOUNTER — OFFICE VISIT (OUTPATIENT)
Dept: ORTHOPEDIC SURGERY | Age: 75
End: 2025-03-10
Payer: MEDICARE

## 2025-03-10 VITALS — WEIGHT: 166 LBS | HEIGHT: 66 IN | BODY MASS INDEX: 26.68 KG/M2

## 2025-03-10 DIAGNOSIS — M17.11 PRIMARY OSTEOARTHRITIS OF RIGHT KNEE: Primary | ICD-10-CM

## 2025-03-10 PROCEDURE — 99213 OFFICE O/P EST LOW 20 MIN: CPT | Performed by: ORTHOPAEDIC SURGERY

## 2025-03-10 PROCEDURE — 3017F COLORECTAL CA SCREEN DOC REV: CPT | Performed by: ORTHOPAEDIC SURGERY

## 2025-03-10 PROCEDURE — 1125F AMNT PAIN NOTED PAIN PRSNT: CPT | Performed by: ORTHOPAEDIC SURGERY

## 2025-03-10 PROCEDURE — G8427 DOCREV CUR MEDS BY ELIG CLIN: HCPCS | Performed by: ORTHOPAEDIC SURGERY

## 2025-03-10 PROCEDURE — G8419 CALC BMI OUT NRM PARAM NOF/U: HCPCS | Performed by: ORTHOPAEDIC SURGERY

## 2025-03-10 PROCEDURE — 1123F ACP DISCUSS/DSCN MKR DOCD: CPT | Performed by: ORTHOPAEDIC SURGERY

## 2025-03-10 PROCEDURE — 1036F TOBACCO NON-USER: CPT | Performed by: ORTHOPAEDIC SURGERY

## 2025-03-10 PROCEDURE — 1159F MED LIST DOCD IN RCRD: CPT | Performed by: ORTHOPAEDIC SURGERY

## 2025-03-10 RX ORDER — AMOXICILLIN 250 MG
CAPSULE ORAL
COMMUNITY
Start: 2024-11-12

## 2025-03-10 NOTE — PROGRESS NOTES
intact to all sensory roots.    Cardiovascular:  The vascular exam is normal and is well perfused to distal extremities.  Distal pulses DP/PT: R. 2+; L. 2+.  There is cap refill noted less than two seconds in all digits. There is not edema of the bilateral lower extremities.  There is not varicosities noted in the distal extremities.      Lymph:  Upon palpation,  there is no lymphadenopathy noted in bilateral lower extremities.      Musculoskeletal:  Gait: antalgic; examination of the nails and digits reveal no cyanosis or clubbing.    Lumbar exam:  On visual inspection, there is not deformity of the spine.   full range of motion, no tenderness, palpable spasm or pain on motion. Special tests: Straight Leg Raise negative, Antonia test negative.      Hip exam:   Upon inspection, there is not deformity noted.  Upon palpation there is not tenderness.  ROM: is  full and symmetrical.   Strength: Hip Flexors 5/5; Hip Abductors 5/5; Hip Adduction 5/5.     Knee exam:  Right knee exam shows;  range of motion of R. Knee is 15 to 115, and L. Knee is 0 to 120. The patient does have  pain on motion, effusion is mild, there is tenderness over the  medial region, there are not any masses, there is not ligamentous instability, there is not  deformity noted.    Knee exam: bilateral positive for moderate crepitations, some mild tenderness laxity is not noted with stress.  There is not a popliteal cyst.    R. Knee:  Lachman's negative, Anterior Drawer negative, Posterior Drawer negative  Sj's negative, Thallasy  negative,   PF grind test negative, Apprehension test negative, Patellar J sign  negative  L. Knee:  Lachman's negative, Anterior Drawer negative, Posterior Drawer negative  Sj's negative, Thallasy  negative,   PF grind test negative, Apprehension test negative,  Patellar J sign  negative    Xray Exam:  Severe medial compartment djd right knee  Radiographic findings reviewed with patient    Assessment:  Encounter

## 2025-04-01 ENCOUNTER — HOSPITAL ENCOUNTER (EMERGENCY)
Age: 75
Discharge: HOME | End: 2025-04-01
Payer: MEDICARE

## 2025-04-01 ENCOUNTER — APPOINTMENT (OUTPATIENT)
Dept: GENERAL RADIOLOGY | Age: 75
End: 2025-04-01
Payer: MEDICARE

## 2025-04-01 VITALS
HEART RATE: 67 BPM | RESPIRATION RATE: 18 BRPM | SYSTOLIC BLOOD PRESSURE: 158 MMHG | WEIGHT: 164 LBS | TEMPERATURE: 97.6 F | OXYGEN SATURATION: 100 % | DIASTOLIC BLOOD PRESSURE: 86 MMHG | BODY MASS INDEX: 26.47 KG/M2

## 2025-04-01 DIAGNOSIS — M17.12 OSTEOARTHRITIS OF LEFT KNEE, UNSPECIFIED OSTEOARTHRITIS TYPE: Primary | ICD-10-CM

## 2025-04-01 PROCEDURE — 73560 X-RAY EXAM OF KNEE 1 OR 2: CPT

## 2025-04-01 PROCEDURE — 99211 OFF/OP EST MAY X REQ PHY/QHP: CPT

## 2025-04-01 ASSESSMENT — PAIN - FUNCTIONAL ASSESSMENT: PAIN_FUNCTIONAL_ASSESSMENT: 0-10

## 2025-04-01 ASSESSMENT — PAIN SCALES - GENERAL: PAINLEVEL_OUTOF10: 8

## 2025-04-01 NOTE — ED PROVIDER NOTES
warm and dry.      Findings: No abrasion or rash.   Neurological:      Mental Status: He is alert and oriented to person, place, and time.      GCS: GCS eye subscore is 4. GCS verbal subscore is 5. GCS motor subscore is 6.         -------------------------------------------------- RESULTS -------------------------------------------------  No results found for this visit on 04/01/25.  Vascular duplex lower extremity venous left   Final Result   No evidence of DVT in the left lower extremity.         XR KNEE LEFT (1-2 VIEWS)   Final Result   Mild to moderate tricompartmental osteoarthritis.             Labs Reviewed - No data to display    When ordered only abnormal lab results are displayed. All other labs were within normal range or not returned as of this dictation.    Interpretation per the Radiologist below, if available at the time of this note:    Vascular duplex lower extremity venous left   Final Result   No evidence of DVT in the left lower extremity.         XR KNEE LEFT (1-2 VIEWS)   Final Result   Mild to moderate tricompartmental osteoarthritis.           No results found.    No results found.     -------------------------------------------------PROCEDURES--------------------------------------------  Unless otherwise noted below, none      Procedures      ---EMERGENCY DEPARTMENT COURSE and DIFFERENTIAL DIAGNOSIS/MDM---  (CC/HPI Summary, DDx, ED Course, and Reassessment:) (Disposition Considerations (include 1 Tests not done, Admit vs D/C, Shared Decision Making, Pt Expectation of Test or Tx., Consults, Social Determinants, Chronic Conditiions, Records reviewed)    MDM  Number of Diagnoses or Management Options  Osteoarthritis of left knee, unspecified osteoarthritis type  Diagnosis management comments: This is a 74-year-old male that presents to urgent care with his wife.  Patient has been having some left upper calf and left knee pain and swelling this past week.  He had been climbing on a ladder.  He

## 2025-06-09 DIAGNOSIS — E78.2 MIXED HYPERLIPIDEMIA: ICD-10-CM

## 2025-06-09 RX ORDER — ALLOPURINOL 300 MG/1
300 TABLET ORAL DAILY
Qty: 90 TABLET | Refills: 0 | Status: SHIPPED | OUTPATIENT
Start: 2025-06-09

## 2025-06-09 RX ORDER — PRAVASTATIN SODIUM 40 MG
40 TABLET ORAL DAILY
Qty: 90 TABLET | Refills: 0 | Status: SHIPPED | OUTPATIENT
Start: 2025-06-09

## 2025-06-09 RX ORDER — AMLODIPINE BESYLATE 5 MG/1
5 TABLET ORAL DAILY
Qty: 90 TABLET | Refills: 0 | Status: SHIPPED | OUTPATIENT
Start: 2025-06-09

## 2025-06-09 NOTE — TELEPHONE ENCOUNTER
Name of Medication(s) Requested:  Requested Prescriptions     Pending Prescriptions Disp Refills    allopurinol (ZYLOPRIM) 300 MG tablet [Pharmacy Med Name: ALLOPURINOL TABS 300MG] 90 tablet 0     Sig: TAKE 1 TABLET DAILY    amLODIPine (NORVASC) 5 MG tablet [Pharmacy Med Name: AMLODIPINE BESYLATE TABS 5MG] 90 tablet 0     Sig: TAKE 1 TABLET DAILY    pravastatin (PRAVACHOL) 40 MG tablet [Pharmacy Med Name: PRAVASTATIN TABS 40MG] 90 tablet 0     Sig: TAKE 1 TABLET DAILY       Medication is on current medication list Yes    Dosage and directions were verified? Yes    Quantity verified: 90 day supply     Pharmacy Verified?  Yes    Last Appointment:  3/5/2025    Future appts:  Future Appointments   Date Time Provider Department Center   6/17/2025 10:00 AM Juan Pyle DO [facility]   6/30/2025  9:30 AM Tete Wilson MD [facility] DEP   9/5/2025  9:15 AM Tete Wilson MD [facility] DEP        (If no appt send self scheduling link. .REFILLAPPT)  Scheduling request sent?     [] Yes  [] No    Does patient need updated?  [] Yes  [x] No

## 2025-06-17 ENCOUNTER — OFFICE VISIT (OUTPATIENT)
Dept: ORTHOPEDIC SURGERY | Age: 75
End: 2025-06-17
Payer: MEDICARE

## 2025-06-17 VITALS — HEIGHT: 66 IN | WEIGHT: 164 LBS | BODY MASS INDEX: 26.36 KG/M2

## 2025-06-17 DIAGNOSIS — M17.11 PRIMARY OSTEOARTHRITIS OF RIGHT KNEE: Primary | ICD-10-CM

## 2025-06-17 PROCEDURE — 1123F ACP DISCUSS/DSCN MKR DOCD: CPT | Performed by: ORTHOPAEDIC SURGERY

## 2025-06-17 PROCEDURE — 1159F MED LIST DOCD IN RCRD: CPT | Performed by: ORTHOPAEDIC SURGERY

## 2025-06-17 PROCEDURE — G8427 DOCREV CUR MEDS BY ELIG CLIN: HCPCS | Performed by: ORTHOPAEDIC SURGERY

## 2025-06-17 PROCEDURE — 99214 OFFICE O/P EST MOD 30 MIN: CPT | Performed by: ORTHOPAEDIC SURGERY

## 2025-06-17 PROCEDURE — 3017F COLORECTAL CA SCREEN DOC REV: CPT | Performed by: ORTHOPAEDIC SURGERY

## 2025-06-17 PROCEDURE — G8419 CALC BMI OUT NRM PARAM NOF/U: HCPCS | Performed by: ORTHOPAEDIC SURGERY

## 2025-06-17 PROCEDURE — 1036F TOBACCO NON-USER: CPT | Performed by: ORTHOPAEDIC SURGERY

## 2025-06-17 PROCEDURE — 1125F AMNT PAIN NOTED PAIN PRSNT: CPT | Performed by: ORTHOPAEDIC SURGERY

## 2025-06-17 NOTE — PROGRESS NOTES
Chief Complaint   Patient presents with    Knee Pain     Right knee -   Patient states that pain is off and on- depends on the activity that he is doing  States he has more discomfort during the day- denies pain at night   Pain level today 3       Subjective:     Patient ID: Luca Spicer is a 74 y.o..  male    Knee Pain  Patient presented for follow up of right knee pain. This is evaluated as a personal injury. There was not a history of injury.  The pain began several years ago. The pain is located medial. He describes  His symptoms as aching and sharp. He has experienced popping, clicking, locking, and giving way in the affected knee.  The patient has had pain with kneeling, squating, and climbing stairs.  Symptoms improve with rest. The symptoms are worse with activity, stair climbing, kneeling. The knee has given out or felt unstable. The patient can bend and straighten the knee fully.  The patient is active in none. Treatment to date has been ice, Tylenol, NSAID's, knee sleeve/brace, cortisone injection, without significant relief. The patient has failed all attempts at conservative treatment including: cortisone injection, visco injections, zilretta injections, physician directed HEP, nsaids, pain medication, OTC medication, ice, heat, use of assistive device and activity restriction.    Past Medical History:   Diagnosis Date    Hyperlipidemia     Hypertension      Past Surgical History:   Procedure Laterality Date    CHOLECYSTECTOMY      HERNIA REPAIR         Current Outpatient Medications:     allopurinol (ZYLOPRIM) 300 MG tablet, TAKE 1 TABLET DAILY, Disp: 90 tablet, Rfl: 0    amLODIPine (NORVASC) 5 MG tablet, TAKE 1 TABLET DAILY, Disp: 90 tablet, Rfl: 0    pravastatin (PRAVACHOL) 40 MG tablet, TAKE 1 TABLET DAILY, Disp: 90 tablet, Rfl: 0    lisinopril (PRINIVIL;ZESTRIL) 20 MG tablet, Take 1 tablet by mouth daily, Disp: 90 tablet, Rfl: 0    KRILL OIL PO, Take 1 capsule by mouth daily, Disp: , Rfl:

## 2025-06-25 ENCOUNTER — PREP FOR PROCEDURE (OUTPATIENT)
Dept: ORTHOPEDIC SURGERY | Age: 75
End: 2025-06-25

## 2025-06-25 PROBLEM — M17.11 RIGHT KNEE DJD: Status: ACTIVE | Noted: 2025-06-25

## 2025-06-26 NOTE — PROGRESS NOTES
Luca has an appointment with his PCP on 6/30 for medical clearance. He dropped off cardiac clearance paperwork on 6/18 to be filled out by cardiologist.

## 2025-06-30 ENCOUNTER — OFFICE VISIT (OUTPATIENT)
Dept: PRIMARY CARE CLINIC | Age: 75
End: 2025-06-30

## 2025-06-30 VITALS
HEART RATE: 80 BPM | OXYGEN SATURATION: 95 % | SYSTOLIC BLOOD PRESSURE: 132 MMHG | HEIGHT: 66 IN | WEIGHT: 162.7 LBS | DIASTOLIC BLOOD PRESSURE: 78 MMHG | TEMPERATURE: 98 F | BODY MASS INDEX: 26.15 KG/M2

## 2025-06-30 DIAGNOSIS — M17.11 PRIMARY OSTEOARTHRITIS OF RIGHT KNEE: ICD-10-CM

## 2025-06-30 DIAGNOSIS — E78.2 MIXED HYPERLIPIDEMIA: ICD-10-CM

## 2025-06-30 DIAGNOSIS — I45.10 RIGHT BUNDLE BRANCH BLOCK: ICD-10-CM

## 2025-06-30 DIAGNOSIS — Z01.818 PREOPERATIVE CLEARANCE: Primary | ICD-10-CM

## 2025-06-30 DIAGNOSIS — R73.01 IMPAIRED FASTING BLOOD SUGAR: ICD-10-CM

## 2025-06-30 DIAGNOSIS — M1A.00X0 IDIOPATHIC CHRONIC GOUT WITHOUT TOPHUS, UNSPECIFIED SITE: ICD-10-CM

## 2025-06-30 DIAGNOSIS — N40.0 BENIGN PROSTATIC HYPERPLASIA WITHOUT LOWER URINARY TRACT SYMPTOMS: ICD-10-CM

## 2025-06-30 DIAGNOSIS — I10 BENIGN ESSENTIAL HTN: ICD-10-CM

## 2025-06-30 RX ORDER — LISINOPRIL 20 MG/1
20 TABLET ORAL DAILY
Qty: 90 TABLET | Refills: 3 | Status: SHIPPED | OUTPATIENT
Start: 2025-06-30

## 2025-06-30 NOTE — PROGRESS NOTES
this visit:    Preoperative clearance    Benign prostatic hyperplasia without lower urinary tract symptoms    Mixed hyperlipidemia/stable  -     Lipid Panel; Future  -     Cancel: Hepatic Function Panel; Future  -     Comprehensive Metabolic Panel; Future    Idiopathic chronic gout without tophus, unspecified site    Benign essential HTN/stable  -     lisinopril (PRINIVIL;ZESTRIL) 20 MG tablet; Take 1 tablet by mouth daily  -     Comprehensive Metabolic Panel; Future    Right bundle branch block    Impaired fasting blood sugar    Primary osteoarthritis of right knee         There are no Patient Instructions on file for this visit.     Patient is medically cleared for surgery.          Tete Wilson MD   6/30/25

## 2025-07-02 ENCOUNTER — TELEPHONE (OUTPATIENT)
Dept: ORTHOPEDIC SURGERY | Age: 75
End: 2025-07-02

## 2025-07-02 DIAGNOSIS — M17.11 PRIMARY OSTEOARTHRITIS OF RIGHT KNEE: Primary | ICD-10-CM

## 2025-07-07 ENCOUNTER — ANESTHESIA EVENT (OUTPATIENT)
Dept: OPERATING ROOM | Age: 75
End: 2025-07-07
Payer: MEDICARE

## 2025-07-07 ENCOUNTER — HOSPITAL ENCOUNTER (OUTPATIENT)
Dept: GENERAL RADIOLOGY | Age: 75
Discharge: HOME | End: 2025-07-09
Payer: MEDICARE

## 2025-07-07 ENCOUNTER — HOSPITAL ENCOUNTER (OUTPATIENT)
Dept: PREADMISSION TESTING | Age: 75
Discharge: HOME | End: 2025-07-07
Payer: MEDICARE

## 2025-07-07 VITALS
BODY MASS INDEX: 25.71 KG/M2 | OXYGEN SATURATION: 98 % | TEMPERATURE: 97.9 F | HEIGHT: 66 IN | SYSTOLIC BLOOD PRESSURE: 155 MMHG | HEART RATE: 60 BPM | RESPIRATION RATE: 16 BRPM | DIASTOLIC BLOOD PRESSURE: 77 MMHG | WEIGHT: 160 LBS

## 2025-07-07 DIAGNOSIS — Z01.818 PRE-OP EVALUATION: ICD-10-CM

## 2025-07-07 LAB
ALBUMIN SERPL-MCNC: 4.1 G/DL (ref 3.5–5.2)
ALP SERPL-CCNC: 82 U/L (ref 40–129)
ALT SERPL-CCNC: 16 U/L (ref 0–50)
ANION GAP SERPL CALCULATED.3IONS-SCNC: 10 MMOL/L (ref 7–16)
AST SERPL-CCNC: 22 U/L (ref 0–50)
BASOPHILS # BLD: 0.07 K/UL (ref 0–0.2)
BASOPHILS NFR BLD: 1 % (ref 0–2)
BILIRUB SERPL-MCNC: 0.7 MG/DL (ref 0–1.2)
BILIRUB UR QL STRIP: NEGATIVE
BUN SERPL-MCNC: 17 MG/DL (ref 8–23)
CALCIUM SERPL-MCNC: 9.2 MG/DL (ref 8.8–10.2)
CHLORIDE SERPL-SCNC: 106 MMOL/L (ref 98–107)
CLARITY UR: CLEAR
CO2 SERPL-SCNC: 27 MMOL/L (ref 22–29)
COLOR UR: YELLOW
COMMENT: NORMAL
CREAT SERPL-MCNC: 1 MG/DL (ref 0.7–1.2)
EOSINOPHIL # BLD: 0.15 K/UL (ref 0.05–0.5)
EOSINOPHILS RELATIVE PERCENT: 2 % (ref 0–6)
ERYTHROCYTE [DISTWIDTH] IN BLOOD BY AUTOMATED COUNT: 13.2 % (ref 11.5–15)
GFR, ESTIMATED: 85 ML/MIN/1.73M2
GLUCOSE SERPL-MCNC: 111 MG/DL (ref 74–99)
GLUCOSE UR STRIP-MCNC: NEGATIVE MG/DL
HBA1C MFR BLD: 6 % (ref 4–5.6)
HCT VFR BLD AUTO: 45.5 % (ref 37–54)
HGB BLD-MCNC: 15.9 G/DL (ref 12.5–16.5)
HGB UR QL STRIP.AUTO: NEGATIVE
IMM GRANULOCYTES # BLD AUTO: 0.07 K/UL (ref 0–0.58)
IMM GRANULOCYTES NFR BLD: 1 % (ref 0–5)
INR PPP: 1
KETONES UR STRIP-MCNC: NEGATIVE MG/DL
LEUKOCYTE ESTERASE UR QL STRIP: NEGATIVE
LYMPHOCYTES NFR BLD: 1.08 K/UL (ref 1.5–4)
LYMPHOCYTES RELATIVE PERCENT: 15 % (ref 20–42)
MCH RBC QN AUTO: 32.3 PG (ref 26–35)
MCHC RBC AUTO-ENTMCNC: 34.9 G/DL (ref 32–34.5)
MCV RBC AUTO: 92.3 FL (ref 80–99.9)
MONOCYTES NFR BLD: 0.51 K/UL (ref 0.1–0.95)
MONOCYTES NFR BLD: 7 % (ref 2–12)
NEUTROPHILS NFR BLD: 74 % (ref 43–80)
NEUTS SEG NFR BLD: 5.35 K/UL (ref 1.8–7.3)
NITRITE UR QL STRIP: NEGATIVE
PARTIAL THROMBOPLASTIN TIME: 29.3 SEC (ref 24.5–35.1)
PH UR STRIP: 5.5 [PH] (ref 5–8)
PLATELET # BLD AUTO: 201 K/UL (ref 130–450)
PMV BLD AUTO: 9.6 FL (ref 7–12)
POTASSIUM SERPL-SCNC: 4.2 MMOL/L (ref 3.5–5.1)
PREALB SERPL-MCNC: 22.5 MG/DL (ref 20–40)
PROT SERPL-MCNC: 7.1 G/DL (ref 6.4–8.3)
PROT UR STRIP-MCNC: NEGATIVE MG/DL
PROTHROMBIN TIME: 10.4 SEC (ref 9.3–12.4)
RBC # BLD AUTO: 4.93 M/UL (ref 3.8–5.8)
SODIUM SERPL-SCNC: 143 MMOL/L (ref 136–145)
SP GR UR STRIP: 1.02 (ref 1–1.03)
UROBILINOGEN UR STRIP-ACNC: 0.2 EU/DL (ref 0–1)
WBC OTHER # BLD: 7.2 K/UL (ref 4.5–11.5)

## 2025-07-07 PROCEDURE — 85610 PROTHROMBIN TIME: CPT

## 2025-07-07 PROCEDURE — 87081 CULTURE SCREEN ONLY: CPT

## 2025-07-07 PROCEDURE — 93005 ELECTROCARDIOGRAM TRACING: CPT

## 2025-07-07 PROCEDURE — 85730 THROMBOPLASTIN TIME PARTIAL: CPT

## 2025-07-07 PROCEDURE — 81003 URINALYSIS AUTO W/O SCOPE: CPT

## 2025-07-07 PROCEDURE — 71046 X-RAY EXAM CHEST 2 VIEWS: CPT

## 2025-07-07 PROCEDURE — 87086 URINE CULTURE/COLONY COUNT: CPT

## 2025-07-07 PROCEDURE — 83036 HEMOGLOBIN GLYCOSYLATED A1C: CPT

## 2025-07-07 PROCEDURE — 80053 COMPREHEN METABOLIC PANEL: CPT

## 2025-07-07 PROCEDURE — 84134 ASSAY OF PREALBUMIN: CPT

## 2025-07-07 PROCEDURE — 85025 COMPLETE CBC W/AUTO DIFF WBC: CPT

## 2025-07-07 ASSESSMENT — KOOS JR
RISING FROM SITTING: MODERATE
STANDING UPRIGHT: MODERATE
BENDING TO THE FLOOR TO PICK UP OBJECT: SEVERE
KOOS JR TOTAL INTERVAL SCORE: 47.487
HOW SEVERE IS YOUR KNEE STIFFNESS AFTER FIRST WAKING IN MORNING: SEVERE
TWISING OR PIVOTING ON KNEE: MODERATE
STRAIGHTENING KNEE FULLY: MODERATE
GOING UP OR DOWN STAIRS: MODERATE

## 2025-07-07 ASSESSMENT — PROMIS GLOBAL HEALTH SCALE
IN THE PAST 7 DAYS, HOW WOULD YOU RATE YOUR FATIGUE ON AVERAGE [ON A SCALE FROM 1 (NONE) TO 5 (VERY SEVERE)]?: MILD
WHO IS THE PERSON COMPLETING THE PROMIS V1.1 SURVEY?: SELF
SUM OF RESPONSES TO QUESTIONS 3, 6, 7, & 8: 17
TO WHAT EXTENT ARE YOU ABLE TO CARRY OUT YOUR EVERYDAY PHYSICAL ACTIVITIES SUCH AS WALKING, CLIMBING STAIRS, CARRYING GROCERIES, OR MOVING A CHAIR [ON A SCALE OF 1 (NOT AT ALL) TO 5 (COMPLETELY)]?: COMPLETELY
IN GENERAL, PLEASE RATE HOW WELL YOU CARRY OUT YOUR USUAL SOCIAL ACTIVITIES (INCLUDES ACTIVITIES AT HOME, AT WORK, AND IN YOUR COMMUNITY, AND RESPONSIBILITIES AS A PARENT, CHILD, SPOUSE, EMPLOYEE, FRIEND, ETC) [ON A SCALE OF 1 (POOR) TO 5 (EXCELLENT)]?: GOOD
IN GENERAL, WOULD YOU SAY YOUR HEALTH IS...[ON A SCALE OF 1 (POOR) TO 5 (EXCELLENT)]: GOOD
IN THE PAST 7 DAYS, HOW WOULD YOU RATE YOUR PAIN ON AVERAGE [ON A SCALE FROM 0 (NO PAIN) TO 10 (WORST IMAGINABLE PAIN)]?: 5
IN THE PAST 7 DAYS, HOW OFTEN HAVE YOU BEEN BOTHERED BY EMOTIONAL PROBLEMS, SUCH AS FEELING ANXIOUS, DEPRESSED, OR IRRITABLE [ON A SCALE FROM 1 (NEVER) TO 5 (ALWAYS)]?: NEVER
IN GENERAL, HOW WOULD YOU RATE YOUR SATISFACTION WITH YOUR SOCIAL ACTIVITIES AND RELATIONSHIPS [ON A SCALE OF 1 (POOR) TO 5 (EXCELLENT)]?: VERY GOOD
IN GENERAL, WOULD YOU SAY YOUR QUALITY OF LIFE IS...[ON A SCALE OF 1 (POOR) TO 5 (EXCELLENT)]: VERY GOOD
HOW IS THE PROMIS V1.1 BEING ADMINISTERED?: PAPER
IN GENERAL, HOW WOULD YOU RATE YOUR MENTAL HEALTH, INCLUDING YOUR MOOD AND YOUR ABILITY TO THINK [ON A SCALE OF 1 (POOR) TO 5 (EXCELLENT)]?: GOOD
IN GENERAL, HOW WOULD YOU RATE YOUR PHYSICAL HEALTH [ON A SCALE OF 1 (POOR) TO 5 (EXCELLENT)]?: GOOD
SUM OF RESPONSES TO QUESTIONS 2, 4, 5, & 10: 16

## 2025-07-07 NOTE — PROGRESS NOTES
[facility]                                                                                                                    PRE OP INSTRUCTIONS FOR  Luca Spicer        Date: 7/7/2025    Date of surgery: 7/14/25 Arrival Time: [facility] will call you between 5pm and 7pm on 7/11/25 with your final arrival time for surgery. Go to  [facility].    Nothing by mouth (NPO) as instructed. May have clear liquids up to 2 hours prior to surgery. Nothing solid after midnight. Examples: water, apple juice, black coffee, plain tea    Take the following medications with a small sip of water on the morning of Surgery: albuterol, amlodipine     Aspirin, Ibuprofen, Advil, Naproxen, other Anti-inflammatory products should be stopped before surgery as directed by your surgeon, cardiologist, or primary care Doctor. Herbal supplements and Vitamin E should be stopped five days prior.  May take Tylenol unless instructed otherwise by your surgeon.    Check with your Doctor regarding stopping Plavix, Coumadin, Lovenox, Eliquis, Effient, or other blood thinners such as, pradaxa, lixiana, xaralto and savaysa.    Do not smoke, chew tobacco, vape, or use illicit drugs and do not drink any alcoholic beverages 24 hours prior to surgery.    You may brush your teeth the morning of surgery.      You MUST make arrangements for a responsible adult, 18 and over, to take you home after your surgery. You will not be allowed to leave alone or drive yourself home.  You will need someone stay with you the first 24 hrs. Your surgery will be cancelled if you do not have a ride home or someone to stay with you.      Please wear simple, loose fitting clothing to the hospital.  Do not bring valuables (money, credit cards, checkbooks, etc.) Do not wear any makeup (including no eye makeup) or nail polish on your fingers or toes.    DO NOT wear any jewelry or piercings on day of surgery.  All body piercings and

## 2025-07-07 NOTE — PROGRESS NOTES
Patient and wife attended preoperative [facility] on 7/7/2025.  Patient is scheduled to have an elective knee replacement.  Patient was educated regarding Disease Process, Medications, Smoking Cessation, Oxygenation, Incentive Spirometry and Deep Breath and Cough, signs and symptoms of postoperative joint infection that include: Fever, Chills, Pain Control, Drainage and Redness, post-op follow up with orthopaedic surgeon, dressing removal, staple removal, ambulatory devices which include a wheeled walker and cane, bed mobility, correct anatomical alignment, active range of motion, proper transferring technique, incision care, infection prevention measures, non-pharmacologic comfort measures, notification of inadequate pain control measures, pain scale for assessing level of pain, pharmacologic pain management, relaxation techniques.     Equipment needs: WW

## 2025-07-07 NOTE — ANESTHESIA PRE PROCEDURE
last liquid consumption: 07/13/25                        Date of last solid food consumption: 07/13/25    BMI:   Wt Readings from Last 3 Encounters:   07/07/25 72.6 kg (160 lb)   06/30/25 73.8 kg (162 lb 11.2 oz)   06/17/25 74.4 kg (164 lb)     There is no height or weight on file to calculate BMI.    CBC:   Lab Results   Component Value Date/Time    WBC 7.2 07/07/2025 09:38 AM    RBC 4.93 07/07/2025 09:38 AM    HGB 15.9 07/07/2025 09:38 AM    HCT 45.5 07/07/2025 09:38 AM    MCV 92.3 07/07/2025 09:38 AM    RDW 13.2 07/07/2025 09:38 AM     07/07/2025 09:38 AM       CMP:   Lab Results   Component Value Date/Time     07/07/2025 09:38 AM    K 4.2 07/07/2025 09:38 AM     07/07/2025 09:38 AM    CO2 27 07/07/2025 09:38 AM    BUN 17 07/07/2025 09:38 AM    CREATININE 1.0 07/07/2025 09:38 AM    GFRAA >60 09/28/2021 07:01 AM    LABGLOM 85 07/07/2025 09:38 AM    LABGLOM >60 03/12/2024 10:23 AM    GLUCOSE 111 07/07/2025 09:38 AM    CALCIUM 9.2 07/07/2025 09:38 AM    BILITOT 0.7 07/07/2025 09:38 AM    ALKPHOS 82 07/07/2025 09:38 AM    AST 22 07/07/2025 09:38 AM    ALT 16 07/07/2025 09:38 AM       POC Tests: No results for input(s): \"POCGLU\", \"POCNA\", \"POCK\", \"POCCL\", \"POCBUN\", \"POCHEMO\", \"POCHCT\" in the last 72 hours.    Coags:   Lab Results   Component Value Date/Time    PROTIME 10.4 07/07/2025 09:38 AM    INR 1.0 07/07/2025 09:38 AM    APTT 29.3 07/07/2025 09:38 AM       HCG (If Applicable): No results found for: \"PREGTESTUR\", \"PREGSERUM\", \"HCG\", \"HCGQUANT\"     ABGs: No results found for: \"PHART\", \"PO2ART\", \"OSI9UDC\", \"FJK3USY\", \"BEART\", \"S9VWFEGL\"     Type & Screen (If Applicable):  Lab Results   Component Value Date    ABORH A POSITIVE 11/06/2024    LABANTI NEGATIVE 11/06/2024       Drug/Infectious Status (If Applicable):  Lab Results   Component Value Date/Time    HEPCAB NONREACTIVE 03/12/2024 10:23 AM       COVID-19 Screening (If Applicable):   Lab Results   Component Value Date/Time    COVID19 Not

## 2025-07-08 LAB
EKG ATRIAL RATE: 55 BPM
EKG P AXIS: 41 DEGREES
EKG P-R INTERVAL: 166 MS
EKG Q-T INTERVAL: 412 MS
EKG QRS DURATION: 98 MS
EKG QTC CALCULATION (BAZETT): 394 MS
EKG R AXIS: -28 DEGREES
EKG T AXIS: 4 DEGREES
EKG VENTRICULAR RATE: 55 BPM
MICROORGANISM SPEC CULT: ABNORMAL
SERVICE CMNT-IMP: ABNORMAL
SPECIMEN DESCRIPTION: ABNORMAL

## 2025-07-09 LAB
MICROORGANISM SPEC CULT: NORMAL
SERVICE CMNT-IMP: NORMAL
SPECIMEN DESCRIPTION: NORMAL

## 2025-07-14 ENCOUNTER — HOSPITAL ENCOUNTER (OUTPATIENT)
Age: 75
Setting detail: OUTPATIENT SURGERY
Discharge: HOME | End: 2025-07-14
Attending: ORTHOPAEDIC SURGERY | Admitting: ORTHOPAEDIC SURGERY
Payer: MEDICARE

## 2025-07-14 ENCOUNTER — ANESTHESIA (OUTPATIENT)
Dept: OPERATING ROOM | Age: 75
End: 2025-07-14
Payer: MEDICARE

## 2025-07-14 ENCOUNTER — APPOINTMENT (OUTPATIENT)
Dept: OPERATING ROOM | Age: 75
End: 2025-07-14
Attending: ORTHOPAEDIC SURGERY
Payer: MEDICARE

## 2025-07-14 VITALS
OXYGEN SATURATION: 98 % | HEIGHT: 66 IN | BODY MASS INDEX: 25.71 KG/M2 | SYSTOLIC BLOOD PRESSURE: 127 MMHG | WEIGHT: 160 LBS | DIASTOLIC BLOOD PRESSURE: 54 MMHG | RESPIRATION RATE: 16 BRPM | HEART RATE: 51 BPM | TEMPERATURE: 97.6 F

## 2025-07-14 DIAGNOSIS — M17.11 PRIMARY OSTEOARTHRITIS OF RIGHT KNEE: ICD-10-CM

## 2025-07-14 DIAGNOSIS — M17.11 OSTEOARTHRITIS OF RIGHT KNEE, UNSPECIFIED OSTEOARTHRITIS TYPE: ICD-10-CM

## 2025-07-14 DIAGNOSIS — M17.11 RIGHT KNEE DJD: ICD-10-CM

## 2025-07-14 DIAGNOSIS — Z01.818 PRE-OP EVALUATION: Primary | ICD-10-CM

## 2025-07-14 PROCEDURE — 27447 TOTAL KNEE ARTHROPLASTY: CPT | Performed by: ORTHOPAEDIC SURGERY

## 2025-07-14 PROCEDURE — 6360000002 HC RX W HCPCS: Performed by: ORTHOPAEDIC SURGERY

## 2025-07-14 PROCEDURE — 6360000002 HC RX W HCPCS

## 2025-07-14 PROCEDURE — 2500000003 HC RX 250 WO HCPCS

## 2025-07-14 PROCEDURE — 2500000003 HC RX 250 WO HCPCS: Performed by: NURSE ANESTHETIST, CERTIFIED REGISTERED

## 2025-07-14 PROCEDURE — 3600000005 HC SURGERY LEVEL 5 BASE: Performed by: ORTHOPAEDIC SURGERY

## 2025-07-14 PROCEDURE — 7100000010 HC PHASE II RECOVERY - FIRST 15 MIN: Performed by: ORTHOPAEDIC SURGERY

## 2025-07-14 PROCEDURE — C1713 ANCHOR/SCREW BN/BN,TIS/BN: HCPCS | Performed by: ORTHOPAEDIC SURGERY

## 2025-07-14 PROCEDURE — 88305 TISSUE EXAM BY PATHOLOGIST: CPT

## 2025-07-14 PROCEDURE — 2580000003 HC RX 258: Performed by: ORTHOPAEDIC SURGERY

## 2025-07-14 PROCEDURE — 2580000003 HC RX 258: Performed by: ANESTHESIOLOGY

## 2025-07-14 PROCEDURE — 6370000000 HC RX 637 (ALT 250 FOR IP): Performed by: ORTHOPAEDIC SURGERY

## 2025-07-14 PROCEDURE — 3700000000 HC ANESTHESIA ATTENDED CARE: Performed by: ORTHOPAEDIC SURGERY

## 2025-07-14 PROCEDURE — 64447 NJX AA&/STRD FEMORAL NRV IMG: CPT | Performed by: ANESTHESIOLOGY

## 2025-07-14 PROCEDURE — 6360000002 HC RX W HCPCS: Performed by: ANESTHESIOLOGY

## 2025-07-14 PROCEDURE — 2580000003 HC RX 258

## 2025-07-14 PROCEDURE — 6360000002 HC RX W HCPCS: Performed by: NURSE ANESTHETIST, CERTIFIED REGISTERED

## 2025-07-14 PROCEDURE — 3700000001 HC ADD 15 MINUTES (ANESTHESIA): Performed by: ORTHOPAEDIC SURGERY

## 2025-07-14 PROCEDURE — 2500000003 HC RX 250 WO HCPCS: Performed by: ORTHOPAEDIC SURGERY

## 2025-07-14 PROCEDURE — 2580000003 HC RX 258: Performed by: NURSE ANESTHETIST, CERTIFIED REGISTERED

## 2025-07-14 PROCEDURE — C1776 JOINT DEVICE (IMPLANTABLE): HCPCS | Performed by: ORTHOPAEDIC SURGERY

## 2025-07-14 PROCEDURE — 88311 DECALCIFY TISSUE: CPT

## 2025-07-14 PROCEDURE — 6370000000 HC RX 637 (ALT 250 FOR IP): Performed by: ANESTHESIOLOGY

## 2025-07-14 PROCEDURE — 3600000015 HC SURGERY LEVEL 5 ADDTL 15MIN: Performed by: ORTHOPAEDIC SURGERY

## 2025-07-14 PROCEDURE — 2709999900 HC NON-CHARGEABLE SUPPLY: Performed by: ORTHOPAEDIC SURGERY

## 2025-07-14 PROCEDURE — 7100000001 HC PACU RECOVERY - ADDTL 15 MIN: Performed by: ORTHOPAEDIC SURGERY

## 2025-07-14 PROCEDURE — 7100000011 HC PHASE II RECOVERY - ADDTL 15 MIN: Performed by: ORTHOPAEDIC SURGERY

## 2025-07-14 PROCEDURE — 73560 X-RAY EXAM OF KNEE 1 OR 2: CPT

## 2025-07-14 PROCEDURE — 7100000000 HC PACU RECOVERY - FIRST 15 MIN: Performed by: ORTHOPAEDIC SURGERY

## 2025-07-14 PROCEDURE — 6370000000 HC RX 637 (ALT 250 FOR IP)

## 2025-07-14 DEVICE — LEGION POSTERIOR STABILIZED HIGH FLEXION HIGHLY CROSS LINKED POLYETHYLENE SIZE 3-4 15MM
Type: IMPLANTABLE DEVICE | Site: KNEE | Status: FUNCTIONAL
Brand: LEGION

## 2025-07-14 DEVICE — FULL DOSE BONE CEMENT, 10 PACK CATALOG NUMBER IS 6191-1-010
Type: IMPLANTABLE DEVICE | Site: KNEE | Status: FUNCTIONAL
Brand: SIMPLEX

## 2025-07-14 DEVICE — GENESIS II RESURFACING PATELLAR                                    PROSTHESIS  29MM
Type: IMPLANTABLE DEVICE | Site: KNEE | Status: FUNCTIONAL
Brand: GENESIS II

## 2025-07-14 DEVICE — LEGION NARROW POSTERIOR STABILIZED                                    OXINIUM SIZE 6N RIGHT
Type: IMPLANTABLE DEVICE | Site: KNEE | Status: FUNCTIONAL
Brand: LEGION

## 2025-07-14 DEVICE — KNEE K1 TOT HEMI STD CEM IMPL CAPPED K1 SN: Type: IMPLANTABLE DEVICE | Site: KNEE | Status: FUNCTIONAL

## 2025-07-14 DEVICE — GENESIS II NON-POROUS TIBIAL                                    BASEPLATE SIZE 4 RIGHT
Type: IMPLANTABLE DEVICE | Site: KNEE | Status: FUNCTIONAL
Brand: GENESIS II

## 2025-07-14 RX ORDER — SCOPOLAMINE 1 MG/3D
1 PATCH, EXTENDED RELEASE TRANSDERMAL
Status: DISCONTINUED | OUTPATIENT
Start: 2025-07-14 | End: 2025-07-14 | Stop reason: HOSPADM

## 2025-07-14 RX ORDER — SODIUM CHLORIDE 0.9 % (FLUSH) 0.9 %
5-40 SYRINGE (ML) INJECTION PRN
Status: DISCONTINUED | OUTPATIENT
Start: 2025-07-14 | End: 2025-07-14 | Stop reason: HOSPADM

## 2025-07-14 RX ORDER — SODIUM CHLORIDE 9 MG/ML
INJECTION, SOLUTION INTRAVENOUS PRN
Status: DISCONTINUED | OUTPATIENT
Start: 2025-07-14 | End: 2025-07-14 | Stop reason: HOSPADM

## 2025-07-14 RX ORDER — MIDAZOLAM HYDROCHLORIDE 1 MG/ML
INJECTION, SOLUTION INTRAMUSCULAR; INTRAVENOUS
Status: DISCONTINUED | OUTPATIENT
Start: 2025-07-14 | End: 2025-07-14 | Stop reason: SDUPTHER

## 2025-07-14 RX ORDER — CELECOXIB 100 MG/1
100 CAPSULE ORAL ONCE
Status: COMPLETED | OUTPATIENT
Start: 2025-07-14 | End: 2025-07-14

## 2025-07-14 RX ORDER — AMLODIPINE BESYLATE 5 MG/1
5 TABLET ORAL DAILY
Status: CANCELLED | OUTPATIENT
Start: 2025-07-14

## 2025-07-14 RX ORDER — OXYCODONE AND ACETAMINOPHEN 10; 325 MG/1; MG/1
1 TABLET ORAL EVERY 6 HOURS PRN
Qty: 28 TABLET | Refills: 0 | Status: SHIPPED | OUTPATIENT
Start: 2025-07-14 | End: 2025-07-21

## 2025-07-14 RX ORDER — DIPHENHYDRAMINE HYDROCHLORIDE 50 MG/ML
INJECTION, SOLUTION INTRAMUSCULAR; INTRAVENOUS
Status: DISCONTINUED | OUTPATIENT
Start: 2025-07-14 | End: 2025-07-14 | Stop reason: SDUPTHER

## 2025-07-14 RX ORDER — CEPHALEXIN 500 MG/1
500 CAPSULE ORAL 3 TIMES DAILY
Qty: 21 CAPSULE | Refills: 0 | Status: SHIPPED | OUTPATIENT
Start: 2025-07-14 | End: 2025-07-21

## 2025-07-14 RX ORDER — FENTANYL CITRATE 50 UG/ML
INJECTION, SOLUTION INTRAMUSCULAR; INTRAVENOUS
Status: COMPLETED | OUTPATIENT
Start: 2025-07-14 | End: 2025-07-14

## 2025-07-14 RX ORDER — PRAVASTATIN SODIUM 20 MG
40 TABLET ORAL DAILY
Status: CANCELLED | OUTPATIENT
Start: 2025-07-14

## 2025-07-14 RX ORDER — OXYCODONE HYDROCHLORIDE 5 MG/1
5 TABLET ORAL PRN
Status: COMPLETED | OUTPATIENT
Start: 2025-07-14 | End: 2025-07-14

## 2025-07-14 RX ORDER — OXYCODONE HYDROCHLORIDE 5 MG/1
10 TABLET ORAL PRN
Status: COMPLETED | OUTPATIENT
Start: 2025-07-14 | End: 2025-07-14

## 2025-07-14 RX ORDER — SODIUM CHLORIDE 0.9 % (FLUSH) 0.9 %
5-40 SYRINGE (ML) INJECTION EVERY 12 HOURS SCHEDULED
Status: DISCONTINUED | OUTPATIENT
Start: 2025-07-14 | End: 2025-07-14 | Stop reason: HOSPADM

## 2025-07-14 RX ORDER — FENTANYL CITRATE 0.05 MG/ML
25 INJECTION, SOLUTION INTRAMUSCULAR; INTRAVENOUS EVERY 5 MIN PRN
Status: DISCONTINUED | OUTPATIENT
Start: 2025-07-14 | End: 2025-07-14 | Stop reason: HOSPADM

## 2025-07-14 RX ORDER — DEXTROSE MONOHYDRATE AND SODIUM CHLORIDE 5; .45 G/100ML; G/100ML
INJECTION, SOLUTION INTRAVENOUS CONTINUOUS
Status: DISCONTINUED | OUTPATIENT
Start: 2025-07-14 | End: 2025-07-14 | Stop reason: HOSPADM

## 2025-07-14 RX ORDER — CELECOXIB 200 MG/1
200 CAPSULE ORAL DAILY
Qty: 30 CAPSULE | Refills: 3 | Status: SHIPPED | OUTPATIENT
Start: 2025-07-14 | End: 2025-11-11

## 2025-07-14 RX ORDER — SENNOSIDES 8.6 MG
325 CAPSULE ORAL 2 TIMES DAILY
Status: DISCONTINUED | OUTPATIENT
Start: 2025-07-15 | End: 2025-07-14 | Stop reason: HOSPADM

## 2025-07-14 RX ORDER — AMOXICILLIN 250 MG
1 CAPSULE ORAL NIGHTLY
Status: CANCELLED | OUTPATIENT
Start: 2025-07-14

## 2025-07-14 RX ORDER — ONDANSETRON 4 MG/1
4 TABLET, ORALLY DISINTEGRATING ORAL EVERY 8 HOURS PRN
Status: DISCONTINUED | OUTPATIENT
Start: 2025-07-14 | End: 2025-07-14 | Stop reason: HOSPADM

## 2025-07-14 RX ORDER — ALBUTEROL SULFATE 90 UG/1
1 INHALANT RESPIRATORY (INHALATION) EVERY 4 HOURS PRN
Status: CANCELLED | OUTPATIENT
Start: 2025-07-14

## 2025-07-14 RX ORDER — ROPIVACAINE HYDROCHLORIDE 5 MG/ML
INJECTION, SOLUTION EPIDURAL; INFILTRATION; PERINEURAL
Status: COMPLETED | OUTPATIENT
Start: 2025-07-14 | End: 2025-07-14

## 2025-07-14 RX ORDER — ACETAMINOPHEN 500 MG
1000 TABLET ORAL ONCE
Status: COMPLETED | OUTPATIENT
Start: 2025-07-14 | End: 2025-07-14

## 2025-07-14 RX ORDER — ONDANSETRON 2 MG/ML
INJECTION INTRAMUSCULAR; INTRAVENOUS
Status: DISCONTINUED | OUTPATIENT
Start: 2025-07-14 | End: 2025-07-14 | Stop reason: SDUPTHER

## 2025-07-14 RX ORDER — VANCOMYCIN HYDROCHLORIDE 1 G/20ML
INJECTION, POWDER, LYOPHILIZED, FOR SOLUTION INTRAVENOUS PRN
Status: DISCONTINUED | OUTPATIENT
Start: 2025-07-14 | End: 2025-07-14 | Stop reason: ALTCHOICE

## 2025-07-14 RX ORDER — PROCHLORPERAZINE EDISYLATE 5 MG/ML
5 INJECTION INTRAMUSCULAR; INTRAVENOUS
Status: DISCONTINUED | OUTPATIENT
Start: 2025-07-14 | End: 2025-07-14 | Stop reason: HOSPADM

## 2025-07-14 RX ORDER — SODIUM CHLORIDE, SODIUM LACTATE, POTASSIUM CHLORIDE, CALCIUM CHLORIDE 600; 310; 30; 20 MG/100ML; MG/100ML; MG/100ML; MG/100ML
INJECTION, SOLUTION INTRAVENOUS CONTINUOUS
Status: DISCONTINUED | OUTPATIENT
Start: 2025-07-14 | End: 2025-07-14 | Stop reason: HOSPADM

## 2025-07-14 RX ORDER — BUPIVACAINE HYDROCHLORIDE 7.5 MG/ML
INJECTION, SOLUTION INTRASPINAL
Status: COMPLETED | OUTPATIENT
Start: 2025-07-14 | End: 2025-07-14

## 2025-07-14 RX ORDER — VANCOMYCIN HYDROCHLORIDE 1 G/20ML
INJECTION, POWDER, LYOPHILIZED, FOR SOLUTION INTRAVENOUS
Status: DISCONTINUED
Start: 2025-07-14 | End: 2025-07-14 | Stop reason: HOSPADM

## 2025-07-14 RX ORDER — ACETAMINOPHEN 325 MG/1
650 TABLET ORAL EVERY 6 HOURS
Status: DISCONTINUED | OUTPATIENT
Start: 2025-07-15 | End: 2025-07-14 | Stop reason: HOSPADM

## 2025-07-14 RX ORDER — ONDANSETRON 2 MG/ML
4 INJECTION INTRAMUSCULAR; INTRAVENOUS EVERY 6 HOURS PRN
Status: DISCONTINUED | OUTPATIENT
Start: 2025-07-14 | End: 2025-07-14 | Stop reason: HOSPADM

## 2025-07-14 RX ORDER — OXYCODONE HYDROCHLORIDE 5 MG/1
10 TABLET ORAL EVERY 4 HOURS PRN
Status: DISCONTINUED | OUTPATIENT
Start: 2025-07-14 | End: 2025-07-14 | Stop reason: HOSPADM

## 2025-07-14 RX ORDER — DEXAMETHASONE SODIUM PHOSPHATE 10 MG/ML
8 INJECTION, SOLUTION INTRAMUSCULAR; INTRAVENOUS ONCE
Status: COMPLETED | OUTPATIENT
Start: 2025-07-14 | End: 2025-07-14

## 2025-07-14 RX ORDER — MORPHINE SULFATE 2 MG/ML
2 INJECTION, SOLUTION INTRAMUSCULAR; INTRAVENOUS
Status: DISCONTINUED | OUTPATIENT
Start: 2025-07-14 | End: 2025-07-14 | Stop reason: HOSPADM

## 2025-07-14 RX ORDER — PROPOFOL 10 MG/ML
INJECTION, EMULSION INTRAVENOUS
Status: DISCONTINUED | OUTPATIENT
Start: 2025-07-14 | End: 2025-07-14 | Stop reason: SDUPTHER

## 2025-07-14 RX ORDER — ASPIRIN 81 MG/1
81 TABLET ORAL 2 TIMES DAILY
Qty: 60 TABLET | Refills: 0 | Status: SHIPPED | OUTPATIENT
Start: 2025-07-15 | End: 2025-08-14

## 2025-07-14 RX ORDER — DEXAMETHASONE SODIUM PHOSPHATE 10 MG/ML
INJECTION, SOLUTION INTRAMUSCULAR; INTRAVENOUS
Status: DISCONTINUED | OUTPATIENT
Start: 2025-07-14 | End: 2025-07-14 | Stop reason: SDUPTHER

## 2025-07-14 RX ORDER — LISINOPRIL 20 MG/1
20 TABLET ORAL DAILY
Status: CANCELLED | OUTPATIENT
Start: 2025-07-14

## 2025-07-14 RX ORDER — MELOXICAM 7.5 MG/1
3.75 TABLET ORAL DAILY
Status: DISCONTINUED | OUTPATIENT
Start: 2025-07-14 | End: 2025-07-14 | Stop reason: HOSPADM

## 2025-07-14 RX ORDER — METHOCARBAMOL 100 MG/ML
1000 INJECTION, SOLUTION INTRAMUSCULAR; INTRAVENOUS PRN
Status: DISCONTINUED | OUTPATIENT
Start: 2025-07-14 | End: 2025-07-14 | Stop reason: HOSPADM

## 2025-07-14 RX ORDER — GABAPENTIN 100 MG/1
100 CAPSULE ORAL 3 TIMES DAILY
Qty: 90 CAPSULE | Refills: 0 | Status: SHIPPED | OUTPATIENT
Start: 2025-07-14 | End: 2025-08-13

## 2025-07-14 RX ADMIN — CELECOXIB 100 MG: 100 CAPSULE ORAL at 06:35

## 2025-07-14 RX ADMIN — DIPHENHYDRAMINE HYDROCHLORIDE 25 MG: 50 INJECTION INTRAMUSCULAR; INTRAVENOUS at 07:14

## 2025-07-14 RX ADMIN — ACETAMINOPHEN 1000 MG: 500 TABLET ORAL at 06:36

## 2025-07-14 RX ADMIN — MELOXICAM 3.75 MG: 7.5 TABLET ORAL at 10:58

## 2025-07-14 RX ADMIN — SODIUM CHLORIDE, POTASSIUM CHLORIDE, SODIUM LACTATE AND CALCIUM CHLORIDE: 600; 310; 30; 20 INJECTION, SOLUTION INTRAVENOUS at 06:51

## 2025-07-14 RX ADMIN — FENTANYL CITRATE 50 MCG: 50 INJECTION, SOLUTION INTRAMUSCULAR; INTRAVENOUS at 06:53

## 2025-07-14 RX ADMIN — Medication 20 MG: at 07:16

## 2025-07-14 RX ADMIN — DEXMEDETOMIDINE HYDROCHLORIDE 0.4 MCG/KG/HR: 100 INJECTION, SOLUTION INTRAVENOUS at 07:12

## 2025-07-14 RX ADMIN — WATER 2000 MG: 1 INJECTION INTRAMUSCULAR; INTRAVENOUS; SUBCUTANEOUS at 10:35

## 2025-07-14 RX ADMIN — ROPIVACAINE HYDROCHLORIDE 30 ML: 5 INJECTION, SOLUTION EPIDURAL; INFILTRATION; PERINEURAL at 06:57

## 2025-07-14 RX ADMIN — WATER 2000 MG: 1 INJECTION INTRAMUSCULAR; INTRAVENOUS; SUBCUTANEOUS at 07:00

## 2025-07-14 RX ADMIN — FENTANYL CITRATE 25 MCG: 50 INJECTION, SOLUTION INTRAMUSCULAR; INTRAVENOUS at 07:10

## 2025-07-14 RX ADMIN — BUPIVACAINE HYDROCHLORIDE IN DEXTROSE 10 MG: 7.5 INJECTION, SOLUTION SUBARACHNOID at 07:10

## 2025-07-14 RX ADMIN — ONDANSETRON 4 MG: 2 INJECTION, SOLUTION INTRAMUSCULAR; INTRAVENOUS at 07:12

## 2025-07-14 RX ADMIN — DEXAMETHASONE SODIUM PHOSPHATE 10 MG: 10 INJECTION, SOLUTION INTRAMUSCULAR; INTRAVENOUS at 07:14

## 2025-07-14 RX ADMIN — PROPOFOL INJECTABLE EMULSION 50 MCG/KG/MIN: 10 INJECTION, EMULSION INTRAVENOUS at 07:12

## 2025-07-14 RX ADMIN — FENTANYL CITRATE 25 MCG: 50 INJECTION, SOLUTION INTRAMUSCULAR; INTRAVENOUS at 07:24

## 2025-07-14 RX ADMIN — DEXAMETHASONE SODIUM PHOSPHATE 8 MG: 10 INJECTION, SOLUTION INTRAMUSCULAR; INTRAVENOUS at 06:51

## 2025-07-14 RX ADMIN — MIDAZOLAM 2 MG: 1 INJECTION INTRAMUSCULAR; INTRAVENOUS at 06:44

## 2025-07-14 RX ADMIN — OXYCODONE 10 MG: 5 TABLET ORAL at 11:09

## 2025-07-14 ASSESSMENT — PAIN - FUNCTIONAL ASSESSMENT
PAIN_FUNCTIONAL_ASSESSMENT: 0-10
PAIN_FUNCTIONAL_ASSESSMENT: PREVENTS OR INTERFERES SOME ACTIVE ACTIVITIES AND ADLS
PAIN_FUNCTIONAL_ASSESSMENT: 0-10

## 2025-07-14 ASSESSMENT — PAIN DESCRIPTION - DESCRIPTORS
DESCRIPTORS: ACHING;BURNING
DESCRIPTORS: ACHING
DESCRIPTORS: ACHING;BURNING
DESCRIPTORS: BURNING
DESCRIPTORS: ACHING
DESCRIPTORS: ACHING;NAGGING;DISCOMFORT
DESCRIPTORS: ACHING

## 2025-07-14 NOTE — ANESTHESIA POSTPROCEDURE EVALUATION
Department of Anesthesiology  Postprocedure Note    Patient: Luca Spicer  MRN: [id number]  YOB: 1950  Date of evaluation: 7/14/2025    Procedure Summary       Date: 07/14/25 Room / Location: [address]    Anesthesia Start: 0638 Anesthesia Stop: 0928    Procedure: RIGHT KNEE TOTAL KNEE ARTHROPLASTY- 7/14/25 ANTUNEZ AND NEPHEW (Right: Knee) Diagnosis:       Right knee DJD      (Right knee DJD [M17.11])    Surgeons: Juan Pyle DO Responsible Provider: Angela Valverde DO    Anesthesia Type: Regional ASA Status: 3            Anesthesia Type: Regional    Jeffrey Phase I: Jeffrey Score: 9    Jeffrey Phase II: Jeffrey Score: 9    Anesthesia Post Evaluation    Patient location during evaluation: PACU  Patient participation: complete - patient participated  Level of consciousness: awake and alert  Airway patency: patent  Nausea & Vomiting: no nausea and no vomiting  Cardiovascular status: hemodynamically stable  Respiratory status: acceptable  Hydration status: euvolemic  Pain management: adequate    No notable events documented.

## 2025-07-14 NOTE — DISCHARGE INSTRUCTIONS
Discharge Instructions  Total Knee Replacement    General Information:  Recuperation takes 6-12 weeks  Use ice for swelling and discomfort  You may have a low-grade fever (below 101 degrees Fahrenheit)  NO alcohol with pain medication  Do not drive until it is ok with your Surgeon   Do not smoke- it slows healing  Walk with your walker or crutches until your physical therapist says you no longer need them  You may hear some clicking in your knee  Your new knee may cause metal detectors to go off so give [place] extra time at the airport    Pain:   You will probably require prescription pain medication at the time of discharge  Take pain medication before activity and exercise  Ice your knee for 15-20 minutes after each exercise period to reduce pain  Swelling and soreness will decrease over time (usually 6-12 weeks)    Exercises:  The more you move the better you will do.  If you do not move your joint will stiffen and scar tissue could develop.   The first 3 weeks following surgery are critical to achieving full flexion (bending) and extension (straightening) of your knee  You will have physical therapy 3-5 times a week for 2-3 weeks  Do your exercises at least 3 times a week for 2-3 weeks  Walk every hour  Swimming- if you have staples, they will need to be removed and your incision is healed  No high impact, repetitive exercise such as jumping or running    Incision:  If you have staples, they will be removed 10-14 days after your surgery.  It is normal to have some numbness around your incision  Expect soreness, swelling and bruising, it will improve over 4-6 weeks  If no drainage, you may leave the incision open to air.  Place a dry dressing over your incision daily if it is draining. Aquacel dressing may be removed after 7 days.    Medication:  You will be given a prescription for pain medication before you leave the hospital  You will be prescribed a blood thinner (examples: Xarelto, High-dose Aspirin,

## 2025-07-14 NOTE — ANESTHESIA PROCEDURE NOTES
Peripheral Block    Patient location during procedure: OR  Reason for block: procedure for pain  Start time: 7/14/2025 6:52 AM  End time: 7/14/2025 6:59 AM  Staffing  Performed: anesthesiologist and resident/CRNA   Anesthesiologist: Angela Valverde DO  Resident/CRNA: Cara Sagastume APRN - CRNA  Performed by: Cara Sagastume APRN - CRNA  Authorized by: Angela Valverde DO    Preanesthetic Checklist  Completed: patient identified, IV checked, site marked, risks and benefits discussed, surgical/procedural consents, equipment checked, pre-op evaluation, timeout performed, anesthesia consent given, oxygen available, monitors applied/VS acknowledged, fire risk safety assessment completed and verbalized and blood product R/B/A discussed and consented  Peripheral Block   Patient position: supine  Prep: ChloraPrep  Provider prep: mask and sterile gloves  Patient monitoring: cardiac monitor, continuous pulse ox, continuous capnometry, frequent blood pressure checks, IV access, oxygen and responsive to questions  Block type: Femoral  Adductor canal  Laterality: right  Injection technique: single-shot  Guidance: ultrasound guided    Needle   Needle type: insulated echogenic nerve stimulator needle   Needle gauge: 22 G  Needle localization: ultrasound guidance  Needle length: 10 cm  Assessment   Injection assessment: negative aspiration for heme, no paresthesia on injection, local visualized surrounding nerve on ultrasound and no intravascular symptoms  Slow fractionated injection: yes  Hemodynamics: stable  Outcomes: uncomplicated and patient tolerated procedure well    Medications Administered  ropivacaine (NAROPIN) injection 0.5% - Perineural, Thigh Right   30 mL - 7/14/2025 6:57:00 AM

## 2025-07-14 NOTE — ANESTHESIA PROCEDURE NOTES
Spinal Block    Patient location during procedure: OR  End time: 7/14/2025 7:11 AM  Reason for block: primary anesthetic  Staffing  Performed: anesthesiologist and resident/CRNA   Anesthesiologist: Angela Valverde DO  Resident/CRNA: Cara Sagastume APRN - CRNA  Performed by: Cara Sagastume APRN - CRNA  Authorized by: Angela Valverde DO    Spinal Block  Patient position: sitting  Prep: ChloraPrep  Patient monitoring: cardiac monitor, continuous pulse ox and frequent blood pressure checks  Approach: midline  Location: L3/L4  Provider prep: mask and sterile gloves  Local infiltration: lidocaine  Needle  Needle type: Pencan   Needle gauge: 25 G  Needle length: 3.5 in  Kit: [id number]  Expiration date: 2/28/2027  Assessment  Sensory level: T8  Events: cerebrospinal fluid  Swirl obtained: Yes  CSF: clear  Attempts: 1  Hemodynamics: stable  Preanesthetic Checklist  Completed: patient identified, IV checked, site marked, risks and benefits discussed, surgical/procedural consents, equipment checked, pre-op evaluation, timeout performed, anesthesia consent given, oxygen available, monitors applied/VS acknowledged, fire risk safety assessment completed and verbalized and blood product R/B/A discussed and consented

## 2025-07-14 NOTE — OP NOTE
Operative Note      Patient: Luca Spicer  YOB: 1950  MRN: [id number]    Date of Procedure: 7/14/2025    Pre-Op Diagnosis Codes:      * Right knee DJD [M17.11]    Post-Op Diagnosis: Same       Procedure(s):  RIGHT KNEE TOTAL KNEE ARTHROPLASTY- 7/14/25 PENG    Surgeon(s):  Juan Pyle DO    Assistant:   Surgical Assistant: Laverne De Jesus  Resident: Jesse Evans DO    Anesthesia: Spinal    Estimated Blood Loss (mL): less than 100     Complications: None    Specimens:   ID Type Source Tests Collected by Time Destination   A : RIGHT KNEE BONE Specimen Joint, Knee SURGICAL PATHOLOGY Juan Pyle DO 7/14/2025 0750        Implants:  Implant Name Type Inv. Item Serial No.  Lot No. LRB No. Used Action   INSERT TIB SZ 3-4 [id number] KNEE XLPE POST STBL HI FLX LEGION - [id number]  INSERT TIB SZ 3-4 [id number] KNEE XLPE POST STBL HI FLX LEGION  [place] AND [facility] [id number] Right 1 Implanted   COMPONENT FEM SZ 6 ROCHELLE R KNEE OXINIUM POST STBL CHRISTOPHER LEGION - [id number]  COMPONENT FEM SZ 6 ROCHELLE R KNEE OXINIUM POST STBL CHRISTOPHER LEGION  [place] AND [facility] [id number] Right 1 Implanted   BASEPLATE TIB SZ 4 AP50MM ML71MM THK2.3MM R KNEE TI ALLY NP - [id number]  BASEPLATE TIB SZ 4 AP50MM ML71MM THK2.3MM R KNEE TI ALLY NP  [place] [facility] [id number] Right 1 Implanted   COMPONENT PAT [id number] THK9MM KNEE HI WT POLY RESURF GEN II - [id number]  COMPONENT PAT [id number] THK9MM KNEE HI WT POLY RESURF GEN II  [place] [facility] [id number] Right 1 Implanted   CEMENT BNE 40 GM RADIOPAQUE BA SIMPLEX P - [id number]  CEMENT BNE 40 GM RADIOPAQUE BA SIMPLEX P  [employer] [facility] [id number] Right 1 Implanted   KNEE K1 TOT FIDELIA STD CHRISTOPHER IMPL CAPPED K1 SN - [id number]  KNEE K1 TOT FIDELIA STD CHRISTOPHER IMPL CAPPED K1 SN  [place] [facility]  Right 1 Implanted         Drains: * No LDAs found *    Findings:  Present At Time Of Surgery (PATOS)  PACU in stable condition.     Disposition: The patient was taken to PACU in stable condition. Once stable, the patient will be transferred to the floor. Orders have been provided to begin physical therapy, weight bear as tolerated Right lower extremity. Patient received a dose of antibiotics preoperatively. We will continue this for 24 hours postoperatively for infection prophylaxis. The patient will also be started on asa for DVT prophylaxis. We have consulted  and case management for discharge planning and consulted the PCP for medical management.      Electronically signed by Juan Pyle DO on 7/15/2025 at 6:30 AM

## 2025-07-14 NOTE — H&P
Updated H&P    Chief Complaint   Patient presents with    Knee Pain       Right knee -   Patient states that pain is off and on- depends on the activity that he is doing  States he has more discomfort during the day- denies pain at night   Pain level today 3         Subjective:     Patient ID: Luca Spicer is a 74 y.o..  male     Knee Pain  Patient presented for follow up of right knee pain. This is evaluated as a personal injury. There was not a history of injury.  The pain began several years ago. The pain is located medial. He describes  His symptoms as aching and sharp. He has experienced popping, clicking, locking, and giving way in the affected knee.  The patient has had pain with kneeling, squating, and climbing stairs.  Symptoms improve with rest. The symptoms are worse with activity, stair climbing, kneeling. The knee has given out or felt unstable. The patient can bend and straighten the knee fully.  The patient is active in none. Treatment to date has been ice, Tylenol, NSAID's, knee sleeve/brace, cortisone injection, without significant relief. The patient has failed all attempts at conservative treatment including: cortisone injection, visco injections, zilretta injections, physician directed HEP, nsaids, pain medication, OTC medication, ice, heat, use of assistive device and activity restriction.     Past Medical History        Past Medical History:   Diagnosis Date    Hyperlipidemia      Hypertension           Past Surgical History         Past Surgical History:   Procedure Laterality Date    CHOLECYSTECTOMY        HERNIA REPAIR               Current Medication      Current Outpatient Medications:     allopurinol (ZYLOPRIM) 300 MG tablet, TAKE 1 TABLET DAILY, Disp: 90 tablet, Rfl: 0    amLODIPine (NORVASC) 5 MG tablet, TAKE 1 TABLET DAILY, Disp: 90 tablet, Rfl: 0    pravastatin (PRAVACHOL) 40 MG tablet, TAKE 1 TABLET DAILY, Disp: 90 tablet, Rfl: 0    lisinopril (PRINIVIL;ZESTRIL) 20 MG

## 2025-07-15 PROBLEM — Z01.818 PRE-OP EVALUATION: Status: ACTIVE | Noted: 2025-07-15

## 2025-07-17 LAB — SURGICAL PATHOLOGY REPORT: NORMAL

## 2025-07-23 DIAGNOSIS — M17.11 PRIMARY OSTEOARTHRITIS OF RIGHT KNEE: Primary | ICD-10-CM

## 2025-07-28 ENCOUNTER — OFFICE VISIT (OUTPATIENT)
Dept: ORTHOPEDIC SURGERY | Age: 75
End: 2025-07-28

## 2025-07-28 DIAGNOSIS — Z96.651 S/P TOTAL KNEE ARTHROPLASTY, RIGHT: Primary | ICD-10-CM

## 2025-07-28 PROCEDURE — 99024 POSTOP FOLLOW-UP VISIT: CPT

## 2025-07-28 NOTE — PROGRESS NOTES
Subjective:     Post-Operative week: 2 Status Post right Total Knee Arthroplasty, surgery date 7/14/25. Systemic or Specific Complaints:none. He is ambulating with cane. He is doing well. He takes OTC medications as needed.    Objective:     General: alert, appears stated age and cooperative   Wound: Wound clean and dry no evidence of infection., No Erythema, No Edema and No Drainage   Motion: Flexion: 0 to 60 Degrees   DVT Exam: No evidence of DVT seen on physical exam.  Negative Alex's sign.  No cords or calf tenderness.  No significant calf/ankle edema.     Imaging:  Xrays:  No signs of fracture, there is good alignment, and no signs of aseptic loosening.   Radiographic findings reviewed with patient    Assessment:     Encounter Diagnosis   Name Primary?    S/P total knee arthroplasty, right Yes      Doing well postoperatively.     Plan:     Continues current post-op course, staples were removed at today's appointment  Patient is to continue taking enteric coated aspirin 81 mg, 1 tablet twice daily.    Patient is to continue wearing SEFERINO hose, on during the day and off at night.    Outpatient physical therapy is to begin immediately.    No bathing/swimming/hot tub for two weeks or until incision is completely closed.    We will see the patient back in 3 weeks for repeat xray and evaluation.  Please call office with any questions or concerns at [phone]

## 2025-08-14 PROBLEM — Z01.818 PRE-OP EVALUATION: Status: RESOLVED | Noted: 2025-07-15 | Resolved: 2025-08-14

## 2025-08-18 ENCOUNTER — OFFICE VISIT (OUTPATIENT)
Dept: ORTHOPEDIC SURGERY | Age: 75
End: 2025-08-18

## 2025-08-18 VITALS — BODY MASS INDEX: 25.55 KG/M2 | HEIGHT: 66 IN | WEIGHT: 159 LBS

## 2025-08-18 DIAGNOSIS — Z96.651 S/P TOTAL KNEE ARTHROPLASTY, RIGHT: Primary | ICD-10-CM

## 2025-08-18 PROCEDURE — 99024 POSTOP FOLLOW-UP VISIT: CPT | Performed by: ORTHOPAEDIC SURGERY

## 2025-08-26 ENCOUNTER — OFFICE VISIT (OUTPATIENT)
Dept: PRIMARY CARE CLINIC | Age: 75
End: 2025-08-26

## 2025-08-26 VITALS
BODY MASS INDEX: 25.78 KG/M2 | WEIGHT: 160.4 LBS | TEMPERATURE: 97.9 F | DIASTOLIC BLOOD PRESSURE: 78 MMHG | SYSTOLIC BLOOD PRESSURE: 126 MMHG | HEIGHT: 66 IN | OXYGEN SATURATION: 98 % | HEART RATE: 83 BPM

## 2025-08-26 DIAGNOSIS — I10 BENIGN ESSENTIAL HTN: Primary | ICD-10-CM

## 2025-08-26 DIAGNOSIS — E78.2 MIXED HYPERLIPIDEMIA: ICD-10-CM

## 2025-08-26 DIAGNOSIS — R73.01 IMPAIRED FASTING BLOOD SUGAR: ICD-10-CM

## 2025-08-26 DIAGNOSIS — M17.11 PRIMARY OSTEOARTHRITIS OF RIGHT KNEE: ICD-10-CM

## 2025-08-26 DIAGNOSIS — N13.8 BENIGN PROSTATIC HYPERPLASIA WITH URINARY OBSTRUCTION: ICD-10-CM

## 2025-08-26 DIAGNOSIS — R10.32 LLQ PAIN: ICD-10-CM

## 2025-08-26 DIAGNOSIS — I45.10 RIGHT BUNDLE BRANCH BLOCK: ICD-10-CM

## 2025-08-26 DIAGNOSIS — N40.1 BENIGN PROSTATIC HYPERPLASIA WITH URINARY OBSTRUCTION: ICD-10-CM

## 2025-08-26 DIAGNOSIS — M1A.00X0 IDIOPATHIC CHRONIC GOUT WITHOUT TOPHUS, UNSPECIFIED SITE: ICD-10-CM

## 2025-08-26 DIAGNOSIS — R19.09 MASS OF LEFT INGUINAL REGION: ICD-10-CM

## 2025-08-26 RX ORDER — AMLODIPINE BESYLATE 5 MG/1
5 TABLET ORAL DAILY
Qty: 90 TABLET | Refills: 0 | Status: SHIPPED | OUTPATIENT
Start: 2025-08-26

## 2025-08-26 RX ORDER — PRAVASTATIN SODIUM 40 MG
40 TABLET ORAL DAILY
Qty: 90 TABLET | Refills: 0 | Status: SHIPPED | OUTPATIENT
Start: 2025-08-26

## 2025-08-26 RX ORDER — ALLOPURINOL 300 MG/1
300 TABLET ORAL DAILY
Qty: 90 TABLET | Refills: 0 | Status: SHIPPED | OUTPATIENT
Start: 2025-08-26

## 2025-08-28 ENCOUNTER — TELEPHONE (OUTPATIENT)
Dept: PRIMARY CARE CLINIC | Age: 75
End: 2025-08-28

## 2025-08-28 DIAGNOSIS — R19.09 MASS OF LEFT INGUINAL REGION: ICD-10-CM

## 2025-08-28 DIAGNOSIS — K57.30 DIVERTICULOSIS OF SIGMOID COLON: Primary | ICD-10-CM

## 2025-08-28 DIAGNOSIS — R10.32 LLQ PAIN: ICD-10-CM

## 2025-08-29 ENCOUNTER — HOSPITAL ENCOUNTER (OUTPATIENT)
Dept: CT IMAGING | Age: 75
Discharge: HOME | End: 2025-08-29
Attending: INTERNAL MEDICINE
Payer: MEDICARE

## 2025-08-29 DIAGNOSIS — R19.09 MASS OF LEFT INGUINAL REGION: ICD-10-CM

## 2025-08-29 DIAGNOSIS — R10.32 LLQ PAIN: ICD-10-CM

## 2025-08-29 DIAGNOSIS — K57.30 DIVERTICULOSIS OF SIGMOID COLON: ICD-10-CM

## 2025-08-29 LAB
ALBUMIN SERPL-MCNC: 4.3 G/DL (ref 3.5–5.2)
ALP SERPL-CCNC: 84 U/L (ref 40–129)
ALT SERPL-CCNC: 11 U/L (ref 0–50)
ANION GAP SERPL CALCULATED.3IONS-SCNC: 13 MMOL/L (ref 7–16)
AST SERPL-CCNC: 18 U/L (ref 0–50)
BILIRUB SERPL-MCNC: 0.5 MG/DL (ref 0–1.2)
BUN SERPL-MCNC: 21 MG/DL (ref 8–23)
CALCIUM SERPL-MCNC: 9.5 MG/DL (ref 8.8–10.2)
CHLORIDE SERPL-SCNC: 106 MMOL/L (ref 98–107)
CHOLEST SERPL-MCNC: 153 MG/DL
CO2 SERPL-SCNC: 26 MMOL/L (ref 22–29)
CREAT SERPL-MCNC: 1 MG/DL (ref 0.7–1.2)
GFR, ESTIMATED: 83 ML/MIN/1.73M2
GLUCOSE SERPL-MCNC: 100 MG/DL (ref 74–99)
HDLC SERPL-MCNC: 41 MG/DL
LDLC SERPL CALC-MCNC: 90 MG/DL
POTASSIUM SERPL-SCNC: 3.8 MMOL/L (ref 3.5–5.1)
PROT SERPL-MCNC: 7.1 G/DL (ref 6.4–8.3)
SODIUM SERPL-SCNC: 144 MMOL/L (ref 136–145)
TRIGL SERPL-MCNC: 114 MG/DL
VLDLC SERPL CALC-MCNC: 23 MG/DL

## 2025-08-29 PROCEDURE — 6360000004 HC RX CONTRAST MEDICATION: Performed by: RADIOLOGY

## 2025-08-29 PROCEDURE — 36415 COLL VENOUS BLD VENIPUNCTURE: CPT

## 2025-08-29 PROCEDURE — 74177 CT ABD & PELVIS W/CONTRAST: CPT

## 2025-08-29 PROCEDURE — 80053 COMPREHEN METABOLIC PANEL: CPT

## 2025-08-29 PROCEDURE — 80061 LIPID PANEL: CPT

## 2025-08-29 RX ORDER — IOPAMIDOL 755 MG/ML
18 INJECTION, SOLUTION INTRAVASCULAR
Status: COMPLETED | OUTPATIENT
Start: 2025-08-29 | End: 2025-08-29

## 2025-08-29 RX ORDER — IOPAMIDOL 755 MG/ML
75 INJECTION, SOLUTION INTRAVASCULAR
Status: COMPLETED | OUTPATIENT
Start: 2025-08-29 | End: 2025-08-29

## 2025-08-29 RX ADMIN — IOPAMIDOL 75 ML: 755 INJECTION, SOLUTION INTRAVENOUS at 18:44

## 2025-08-29 RX ADMIN — IOPAMIDOL 18 ML: 755 INJECTION, SOLUTION INTRAVENOUS at 18:44

## 2025-09-02 ENCOUNTER — OFFICE VISIT (OUTPATIENT)
Dept: ORTHOPEDIC SURGERY | Age: 75
End: 2025-09-02

## 2025-09-02 VITALS — HEIGHT: 66 IN | WEIGHT: 160 LBS | BODY MASS INDEX: 25.71 KG/M2 | TEMPERATURE: 98.6 F

## 2025-09-02 DIAGNOSIS — Z96.651 S/P TOTAL KNEE ARTHROPLASTY, RIGHT: Primary | ICD-10-CM

## 2025-09-02 PROCEDURE — 99024 POSTOP FOLLOW-UP VISIT: CPT | Performed by: ORTHOPAEDIC SURGERY

## 2025-09-02 RX ORDER — CEPHALEXIN 500 MG/1
2000 CAPSULE ORAL DAILY PRN
Qty: 4 CAPSULE | Refills: 4 | Status: SHIPPED | OUTPATIENT
Start: 2025-09-02

## (undated) DEVICE — TOTAL KNEE PACK: Brand: MEDLINE INDUSTRIES, INC.

## (undated) DEVICE — GLOVE ORTHO 8   MSG9480

## (undated) DEVICE — SOLUTION IRRIG 1000ML 0.9% SOD CHL USP POUR PLAS BTL

## (undated) DEVICE — 4-PORT MANIFOLD: Brand: NEPTUNE 2

## (undated) DEVICE — SOLUTION IV 1000ML 0.9% SOD CHL PH 5 INJ USP VIAFLX PLAS

## (undated) DEVICE — SUTURE STRATAFIX SYMMETRIC SZ 1 L18IN ABSRB VLT CT1 L36CM SXPP1A404

## (undated) DEVICE — GARMENT,MEDLINE,DVT,INT,CALF,MED, GEN2: Brand: MEDLINE

## (undated) DEVICE — DRESSING HYDROFIBER AQUACEL AG ADVANTAGE 3.5X12 IN

## (undated) DEVICE — WIPES SKIN CLOTH READYPREP 9 X 10.5 IN 2% CHLORHEX GLUCONATE CHG PREOP

## (undated) DEVICE — HANDPIECE SET WITH BONE CLEANING TIP: Brand: INTERPULSE

## (undated) DEVICE — GLOVE ORANGE PI 8   MSG9080

## (undated) DEVICE — BASIC DOUBLE BASIN 2-LF: Brand: MEDLINE INDUSTRIES, INC.

## (undated) DEVICE — NEEDLE SPNL L3.5IN PNK HUB S STL REG WALL FIT STYL W/ QNCKE

## (undated) DEVICE — SUTURE SUTTAPE L40IN DIA1.3MM NONABSORBABLE WHT BLU L26.5MM AR7500

## (undated) DEVICE — 3 BONE CEMENT MIXER: Brand: MIXEVAC